# Patient Record
Sex: MALE
[De-identification: names, ages, dates, MRNs, and addresses within clinical notes are randomized per-mention and may not be internally consistent; named-entity substitution may affect disease eponyms.]

---

## 2017-04-05 ENCOUNTER — APPOINTMENT (OUTPATIENT)
Dept: CARDIOLOGY | Facility: CLINIC | Age: 77
End: 2017-04-05

## 2017-04-10 ENCOUNTER — APPOINTMENT (OUTPATIENT)
Dept: CARDIOLOGY | Facility: CLINIC | Age: 77
End: 2017-04-10

## 2017-11-17 ENCOUNTER — RECORD ABSTRACTING (OUTPATIENT)
Age: 77
End: 2017-11-17

## 2017-11-17 DIAGNOSIS — Z87.891 PERSONAL HISTORY OF NICOTINE DEPENDENCE: ICD-10-CM

## 2017-11-17 DIAGNOSIS — Z78.9 OTHER SPECIFIED HEALTH STATUS: ICD-10-CM

## 2017-11-17 RX ORDER — TAMSULOSIN HYDROCHLORIDE 0.4 MG/1
0.4 CAPSULE ORAL
Refills: 0 | Status: ACTIVE | COMMUNITY

## 2017-11-17 RX ORDER — ALLOPURINOL 300 MG/1
300 TABLET ORAL DAILY
Refills: 0 | Status: ACTIVE | COMMUNITY

## 2017-11-22 ENCOUNTER — APPOINTMENT (OUTPATIENT)
Dept: CARDIOLOGY | Facility: CLINIC | Age: 77
End: 2017-11-22
Payer: MEDICARE

## 2017-11-22 VITALS
OXYGEN SATURATION: 99 % | SYSTOLIC BLOOD PRESSURE: 128 MMHG | HEIGHT: 72 IN | WEIGHT: 175 LBS | BODY MASS INDEX: 23.7 KG/M2 | HEART RATE: 79 BPM | DIASTOLIC BLOOD PRESSURE: 72 MMHG

## 2017-11-22 PROCEDURE — 99214 OFFICE O/P EST MOD 30 MIN: CPT

## 2018-01-19 ENCOUNTER — MEDICATION RENEWAL (OUTPATIENT)
Age: 78
End: 2018-01-19

## 2018-04-02 ENCOUNTER — APPOINTMENT (OUTPATIENT)
Dept: CARDIOLOGY | Facility: CLINIC | Age: 78
End: 2018-04-02
Payer: MEDICARE

## 2018-04-02 VITALS
DIASTOLIC BLOOD PRESSURE: 90 MMHG | HEIGHT: 72 IN | HEART RATE: 80 BPM | BODY MASS INDEX: 24.11 KG/M2 | WEIGHT: 178 LBS | SYSTOLIC BLOOD PRESSURE: 148 MMHG

## 2018-04-02 PROCEDURE — 99214 OFFICE O/P EST MOD 30 MIN: CPT

## 2018-04-02 PROCEDURE — 93306 TTE W/DOPPLER COMPLETE: CPT

## 2018-04-27 ENCOUNTER — RX RENEWAL (OUTPATIENT)
Age: 78
End: 2018-04-27

## 2018-06-12 ENCOUNTER — RX RENEWAL (OUTPATIENT)
Age: 78
End: 2018-06-12

## 2018-06-19 ENCOUNTER — CLINICAL ADVICE (OUTPATIENT)
Age: 78
End: 2018-06-19

## 2018-06-28 ENCOUNTER — MOBILE ON CALL (OUTPATIENT)
Age: 78
End: 2018-06-28

## 2018-10-02 ENCOUNTER — NON-APPOINTMENT (OUTPATIENT)
Age: 78
End: 2018-10-02

## 2018-10-02 ENCOUNTER — APPOINTMENT (OUTPATIENT)
Dept: CARDIOLOGY | Facility: CLINIC | Age: 78
End: 2018-10-02
Payer: MEDICARE

## 2018-10-02 VITALS
OXYGEN SATURATION: 98 % | DIASTOLIC BLOOD PRESSURE: 74 MMHG | WEIGHT: 176 LBS | BODY MASS INDEX: 23.84 KG/M2 | HEART RATE: 84 BPM | SYSTOLIC BLOOD PRESSURE: 130 MMHG | HEIGHT: 72 IN

## 2018-10-02 PROCEDURE — 99214 OFFICE O/P EST MOD 30 MIN: CPT

## 2018-10-02 PROCEDURE — 93000 ELECTROCARDIOGRAM COMPLETE: CPT

## 2018-10-23 DIAGNOSIS — R94.5 ABNORMAL RESULTS OF LIVER FUNCTION STUDIES: ICD-10-CM

## 2018-10-26 ENCOUNTER — RX RENEWAL (OUTPATIENT)
Age: 78
End: 2018-10-26

## 2018-10-30 ENCOUNTER — APPOINTMENT (OUTPATIENT)
Dept: CARDIOLOGY | Facility: CLINIC | Age: 78
End: 2018-10-30

## 2018-11-01 ENCOUNTER — RESULT REVIEW (OUTPATIENT)
Age: 78
End: 2018-11-01

## 2019-01-03 ENCOUNTER — RX RENEWAL (OUTPATIENT)
Age: 79
End: 2019-01-03

## 2019-01-04 ENCOUNTER — RX RENEWAL (OUTPATIENT)
Age: 79
End: 2019-01-04

## 2019-02-22 ENCOUNTER — CLINICAL ADVICE (OUTPATIENT)
Age: 79
End: 2019-02-22

## 2019-04-09 ENCOUNTER — APPOINTMENT (OUTPATIENT)
Dept: CARDIOLOGY | Facility: CLINIC | Age: 79
End: 2019-04-09
Payer: MEDICARE

## 2019-04-09 VITALS
HEIGHT: 73 IN | RESPIRATION RATE: 16 BRPM | SYSTOLIC BLOOD PRESSURE: 140 MMHG | BODY MASS INDEX: 23.33 KG/M2 | HEART RATE: 78 BPM | DIASTOLIC BLOOD PRESSURE: 80 MMHG | WEIGHT: 176 LBS

## 2019-04-09 PROCEDURE — 99214 OFFICE O/P EST MOD 30 MIN: CPT

## 2019-04-09 NOTE — REVIEW OF SYSTEMS
[Fever] : no fever [Chills] : no chills [Headache] : no headache [Recent Weight Gain (___ Lbs)] : no recent weight gain [Feeling Fatigued] : feeling fatigued [Recent Weight Loss (___ Lbs)] : no recent weight loss [see HPI] : see HPI [Chest  Pressure] : no chest pressure [Dyspnea on exertion] : dyspnea during exertion [Shortness Of Breath] : no shortness of breath [Chest Pain] : no chest pain [Leg Claudication] : no intermittent leg claudication [Lower Ext Edema] : no extremity edema [Palpitations] : no palpitations [Negative] : Psychiatric

## 2019-04-09 NOTE — HISTORY OF PRESENT ILLNESS
[FreeTextEntry1] : \par Active medical problems, which includes\par Thoracic aortic dilatation. Stable.\par Pancreatic cyst being followed with gastroenterologist.\par Chronic atrial fibrillation. A rate control. On anticoagulation.\par Hyperlipidemia. Next be on cholesterol-lowering agents. Tolerating it well.\par Essential hypertension. No congestive heart failure or renal insufficiency. His nonsmoker.\par Nonischemic cardiomyopathy. Normal coronary arteries in September 2015. Improved lv systolic function. class 2.

## 2019-04-09 NOTE — DISCUSSION/SUMMARY
[Patient] : the patient [Risks] : risks [Benefits] : benefits [Alternatives] : alternatives [___ Month(s)] : [unfilled] month(s) [With Me] : with me [FreeTextEntry1] : Assessment and recommendations.\par #1Fatigue and tiredness. I improved with decrease dose of metoprolol. At present. His blood pressure and heart rate is stable. We will recheck his echocardiogram for any worsening of LV systolic function. If there is worsening of any systolic function. I would consider 24-hour Holter monitor and based on ventricular rate. We will have to discuss about medical management of his beta blockers. We may also consider changing metoprolol to carvedilol.\par He did also have CBC, CMP, and lipid panel checked again.\par #2 non ischemic dilated cardiomyopathy. No signs of congestive heart failure. Improved ejection fraction. Continue beta blockers and ACE inhibitor. Continue to avoid alcohol as much as possible. He drinks one drink a day of wine. High-grade symptoms review to notify us immediately.\par #3 essential hypertension. Well controlled. Continue present medications.\par #4 hyperlipidemia. On cholesterol-lowering agent. Abnormal liver function tests in the past. Negative hepatitis screen. 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 \par #5 moderate mitral regurgitation. No signs of congestive heart failure. Thoracic aortic dilation. Stable.\par #6 chronic atrial fibrillation .  asymptomatic. rate control. On anticoagulation. Follow CBC, BMP closely. Watch for bleeding.\par #7 preventive care with your office.\par \par Counseling regarding low saturated fat, low carbohydrate intake was reviewed. Active lifestyle and regular. Exercise is along with weight maintenance is advised.\par All the above were at length reviewed. Answered all the questions. Thank you very much for this kind referral. Please do not hesitate to give me a call for any question.\par Part of this transcription was done with voice recognition software and phonetically similar errors are common. I apologize for that. Please donot hesitate to call for any questions due to above.\par \par f/u 3 months

## 2019-04-09 NOTE — PHYSICAL EXAM
[General Appearance - Well Developed] : well developed [Normal Appearance] : normal appearance [Well Groomed] : well groomed [General Appearance - Well Nourished] : well nourished [No Deformities] : no deformities [Normal Conjunctiva] : the conjunctiva exhibited no abnormalities [General Appearance - In No Acute Distress] : no acute distress [No Oral Pallor] : no oral pallor [Normal Jugular Venous A Waves Present] : normal jugular venous A waves present [No Jugular Venous Beth A Waves] : no jugular venous beth A waves [Respiration, Rhythm And Depth] : normal respiratory rhythm and effort [Normal Jugular Venous V Waves Present] : normal jugular venous V waves present [Auscultation Breath Sounds / Voice Sounds] : lungs were clear to auscultation bilaterally [Arterial Pulses Normal] : the arterial pulses were normal [Exaggerated Use Of Accessory Muscles For Inspiration] : no accessory muscle use [Abdomen Soft] : soft [FreeTextEntry1] : irregular irregular hsm 1-2/6 , no gallop/rub/heave or click [Edema] : no peripheral edema present [Abnormal Walk] : normal gait [Gait - Sufficient For Exercise Testing] : the gait was sufficient for exercise testing [Nail Clubbing] : no clubbing of the fingernails [Cyanosis, Localized] : no localized cyanosis [Skin Color & Pigmentation] : normal skin color and pigmentation [] : no rash [No Venous Stasis] : no venous stasis [Skin Lesions] : no skin lesions [No Skin Ulcers] : no skin ulcer [No Xanthoma] : no  xanthoma was observed [Oriented To Time, Place, And Person] : oriented to person, place, and time [Mood] : the mood was normal [Affect] : the affect was normal [No Anxiety] : not feeling anxious

## 2019-04-09 NOTE — REASON FOR VISIT
[Follow-Up - Clinic] : a clinic follow-up of [Cardiomyopathy] : cardiomyopathy [Atrial Fibrillation] : atrial fibrillation [Fatigue] : feeling tired (fatigue) [Hyperlipidemia] : hyperlipidemia [Hypertension] : hypertension [Mitral Regurgitation] : mitral regurgitation [FreeTextEntry1] : 78-year-old comes in for followup consultation. Since last seen while in [place]. He had more fatigue and tiredness than before. He had dyspnea on exertion , more than usual while playing golf. He did not have any chest pain. Did not have any palpitations or PND. He did not have any edema. He did not have any dizziness or syncopal episode. Aggravating factors were activity and exercise. At that point we had decreased his metoprolol to 50 mg twice daily. And, subsequently his symptoms have almost resolved.\par No recent hospital admission for congestive heart failure again.\par No bleeding complications. Taking all his medications regularly.

## 2019-04-09 NOTE — ASSESSMENT
[FreeTextEntry1] : Reviewed on October 2, 2018.\par EKG ordered and interpreted by me October 2, 2018 indications atrial fibrillation. Interpretation atrial fibrillation. Right axis deviation. Nonspecific IVCD. No new change compared to before\par \par past tests for reference\par Echocardiogram  which was done on April 5, 2017, PA EF was 58%. Moderate mitral the decision. Ascending and ordered Dilantin 4 cm aortic arch difficult to visualize possible dilation at 4.6 cm. Moderate mitral regurgitation noted\par CTA;4/12/17: ascending aorta: 3.8 cm.\par Echocardiogram. Reviewed April 2, 2018.\par His ejection fraction around 50%. LVH noted\par Labs from January 2018 were reviewed\par \par

## 2019-04-18 ENCOUNTER — APPOINTMENT (OUTPATIENT)
Dept: CARDIOLOGY | Facility: CLINIC | Age: 79
End: 2019-04-18
Payer: MEDICARE

## 2019-04-18 PROCEDURE — 93306 TTE W/DOPPLER COMPLETE: CPT

## 2019-04-24 ENCOUNTER — APPOINTMENT (OUTPATIENT)
Dept: CARDIOLOGY | Facility: CLINIC | Age: 79
End: 2019-04-24

## 2019-04-30 ENCOUNTER — APPOINTMENT (OUTPATIENT)
Dept: CARDIOLOGY | Facility: CLINIC | Age: 79
End: 2019-04-30
Payer: MEDICARE

## 2019-04-30 VITALS
HEIGHT: 72 IN | BODY MASS INDEX: 23.84 KG/M2 | WEIGHT: 176 LBS | HEART RATE: 59 BPM | OXYGEN SATURATION: 96 % | DIASTOLIC BLOOD PRESSURE: 70 MMHG | SYSTOLIC BLOOD PRESSURE: 120 MMHG

## 2019-04-30 PROCEDURE — 99215 OFFICE O/P EST HI 40 MIN: CPT

## 2019-05-02 NOTE — DISCUSSION/SUMMARY
[Patient] : the patient [Risks] : risks [Benefits] : benefits [Alternatives] : alternatives [___ Month(s)] : [unfilled] month(s) [With Me] : with me [FreeTextEntry1] : Assessment and recommendations.\par #1Fatigue and tiredness, resolved after recent changes in medications.Continue present medication\par #2 Echocardiogram reviewed. Nonischemic cardiomyopathy, with overall improved ejection fraction\par Concentric LVH, worsening by atrial enlargement, and ejection fraction around 50%, stable. Aortic dimensions, significant diastolic LV dysfunction in presence of mild to moderate mitral regurgitation and mild pulmonary hypertension. Reviewed. There is no clinical signs of restrictive or constrictive cardiomyopathy. He had normal coronary arteries in the past. He has good functional status with class II heart failure symptoms. At present in presence of a bowel clinical findings on echocardiogram. In spite of him not having any significant symptoms.\par  I have recommended to him.\par Cardiac MRI to rule out any significant structural heart disease, specifically in the form of infiltrative heart disease, evaluate for any significant restriction, evaluate chamber dimensions and severity of mitral regurgitation, and accurately evaluate the ejection fraction.\par I also recommended him to see him and urine protein electrophoresis\par TSH, Lyme titers, ESR, serum ferritin, and iron studies.\par Continue beta blockers and ACE inhibitor. Continue to avoid alcohol as much as possible. He drinks one drink a day of wine. High-grade symptoms review to notify us immediately.\par #3 essential hypertension. Well controlled. Continue present medications.\par #4 hyperlipidemia. On cholesterol-lowering agent. Abnormal liver function tests in the past. Negative hepatitis screen. 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 \par #5 moderate mitral regurgitation. No signs of congestive heart failure. Thoracic aortic dilation. Stable.\par #6 chronic atrial fibrillation .  asymptomatic. rate control. On anticoagulation. Follow CBC, BMP closely. Watch for bleeding.\par #7 preventive care with your office.\par \par Counseling regarding low saturated fat, low carbohydrate intake was reviewed. Active lifestyle and regular. Exercise is along with weight maintenance is advised.\par All the above were at length reviewed. Answered all the questions. Thank you very much for this kind referral. Please do not hesitate to give me a call for any question.\par Part of this transcription was done with voice recognition software and phonetically similar errors are common. I apologize for that. Please donot hesitate to call for any questions due to above.\par \par f/u as planned

## 2019-05-02 NOTE — ASSESSMENT
[FreeTextEntry1] : Reviewed on October 2, 2018.\par EKG ordered and interpreted by me October 2, 2018 indications atrial fibrillation. Interpretation atrial fibrillation. Right axis deviation. Nonspecific IVCD. No new change compared to before\par \par past tests for reference\par Echocardiogram  which was done on April 5, 2017, PA EF was 58%. Moderate mitral the decision. Ascending and ordered Dilantin 4 cm aortic arch difficult to visualize possible dilation at 4.6 cm. Moderate mitral regurgitation noted\par CTA;4/12/17: ascending aorta: 3.8 cm.\par Echocardiogram. Reviewed April 2, 2018.\par His ejection fraction around 50%. LVH noted\par Labs from January 2018 were reviewed\par \par Reviewed on April 30, 2019\par Recent labs from April 9, 2019\par Showed stable CBC, CMP, and lipid panel.\par Echocardiogram April 18, 2019 showed an ejection fraction around 50%. There is mild to moderate concentric LVH. There is biatrial enlargement. The aortic dimension 4.1 cm. Severe diastolic dysfunction. Mild to moderate mitral and tricuspid regurgitation. Pulmonary artery systolic pressure around 45 mm mercury. There was no pericardial effusion noted\par Last EKG you had no significant low voltage.\par \par

## 2019-05-02 NOTE — PHYSICAL EXAM
[General Appearance - Well Developed] : well developed [Normal Appearance] : normal appearance [Well Groomed] : well groomed [General Appearance - Well Nourished] : well nourished [No Deformities] : no deformities [General Appearance - In No Acute Distress] : no acute distress [Normal Conjunctiva] : the conjunctiva exhibited no abnormalities [No Oral Pallor] : no oral pallor [Normal Jugular Venous A Waves Present] : normal jugular venous A waves present [Normal Jugular Venous V Waves Present] : normal jugular venous V waves present [No Jugular Venous Beth A Waves] : no jugular venous beth A waves [Respiration, Rhythm And Depth] : normal respiratory rhythm and effort [Exaggerated Use Of Accessory Muscles For Inspiration] : no accessory muscle use [Auscultation Breath Sounds / Voice Sounds] : lungs were clear to auscultation bilaterally [Arterial Pulses Normal] : the arterial pulses were normal [Edema] : no peripheral edema present [Abdomen Soft] : soft [Abnormal Walk] : normal gait [Gait - Sufficient For Exercise Testing] : the gait was sufficient for exercise testing [Nail Clubbing] : no clubbing of the fingernails [Cyanosis, Localized] : no localized cyanosis [Skin Color & Pigmentation] : normal skin color and pigmentation [] : no rash [No Venous Stasis] : no venous stasis [Skin Lesions] : no skin lesions [No Skin Ulcers] : no skin ulcer [No Xanthoma] : no  xanthoma was observed [Oriented To Time, Place, And Person] : oriented to person, place, and time [Affect] : the affect was normal [Mood] : the mood was normal [No Anxiety] : not feeling anxious [FreeTextEntry1] : irregular irregular hsm 1-2/6 , no gallop/rub/heave or click

## 2019-05-02 NOTE — ADDENDUM
[FreeTextEntry1] : At present, there are no active cardiac conditions.\par No recent unstable coronary syndrome, decompensated heart failure, severe valvular heart disease or significant dysrhythmias.\par The clinical benefit of the proposed procedure outweighs the associated cardiovascular risk.\par Risk not attenuated with further cardiovascular testing.\par Prior testing as outlined above.\par Optimized from a cardiovascular perspective.\par Control blood pressure, heart rate, pulse oximetry perioperatively.\par If required appropriate intravenous medication for the outpatient oral medication for blood pressure, heart rate control.\par At present, there are no active cardiac conditions.\par No recent unstable coronary syndrome, decompensated heart failure, severe valvular heart disease or significant dysrhythmias.\par The clinical benefit of the proposed procedure outweighs the associated cardiovascular risk.\par Risk not attenuated with further cardiovascular testing.\par Prior testing as outlined above.\par Optimized from a cardiovascular perspective.\par Control blood pressure, heart rate, pulse oximetry perioperatively.\par If required appropriate intravenous medication for the outpatient oral medication for blood pressure, heart rate control\par DVT prophylaxis as per indication.\par \par For surgery and invasive procedures, recommend to discontinue apixaban at least 48 hours prior to elective surgery or invasive procedures with a moderate-to-high risk of clinically significant bleeding. Anticoagulation bridging during the 48 hours apixaban is interrupted and prior to the intervention is not generally required. Reinitiate apixaban when adequate hemostasis has been achieved.  If oral therapy cannot be administered, then consider administration of a parenteral anticoagulant. Note excess discontinuation of any oral anticoagulant, including apixaban, increases the risk of thrombotic events. Patient was counseled and verbalizes understanding of these associated risks\par

## 2019-05-02 NOTE — REASON FOR VISIT
[Follow-Up - Clinic] : a clinic follow-up of [Atrial Fibrillation] : atrial fibrillation [Cardiomyopathy] : cardiomyopathy [Fatigue] : feeling tired (fatigue) [Hyperlipidemia] : hyperlipidemia [Hypertension] : hypertension [Mitral Regurgitation] : mitral regurgitation [FreeTextEntry1] : 78-year-old gentleman comes in for follow up consultation to review his echocardiogram and labs.\par  Overall he has been doing very well since her recent changes in medications.\par He has no chest pain, PND, orthopnea.\par He has no dizziness, lightheadedness, or palpitations.\par He has no bleeding diathesis.\par He has no significant headache, visual disturbances, focal weakness.\par He has no recent hospitalization P.

## 2019-05-02 NOTE — REVIEW OF SYSTEMS
[Negative] : Heme/Lymph [Fever] : no fever [Headache] : no headache [Recent Weight Gain (___ Lbs)] : no recent weight gain [Chills] : no chills [Feeling Fatigued] : not feeling fatigued [Recent Weight Loss (___ Lbs)] : no recent weight loss [Shortness Of Breath] : no shortness of breath [Chest  Pressure] : no chest pressure [Dyspnea on exertion] : not dyspnea during exertion [Leg Claudication] : no intermittent leg claudication [Chest Pain] : no chest pain [Lower Ext Edema] : no extremity edema [Palpitations] : no palpitations

## 2019-05-02 NOTE — HISTORY OF PRESENT ILLNESS
[FreeTextEntry1] : Active medical problems, which includes\par Thoracic aortic dilatation. Stable.\par Pancreatic cyst being followed with gastroenterologist.\par Chronic atrial fibrillation. A rate control. On anticoagulation.\par Hyperlipidemia. Next be on cholesterol-lowering agents. Tolerating it well.\par Essential hypertension. No congestive heart failure or renal insufficiency. His nonsmoker.\par Nonischemic cardiomyopathy. Normal coronary arteries in September 2015. Improved lv systolic function. class 2.

## 2019-07-18 ENCOUNTER — APPOINTMENT (OUTPATIENT)
Dept: CARDIOLOGY | Facility: CLINIC | Age: 79
End: 2019-07-18
Payer: MEDICARE

## 2019-07-18 VITALS
HEIGHT: 72 IN | SYSTOLIC BLOOD PRESSURE: 124 MMHG | DIASTOLIC BLOOD PRESSURE: 70 MMHG | OXYGEN SATURATION: 98 % | HEART RATE: 64 BPM

## 2019-07-18 PROCEDURE — 99215 OFFICE O/P EST HI 40 MIN: CPT

## 2019-07-18 NOTE — DISCUSSION/SUMMARY
[Patient] : the patient [Risks] : risks [Benefits] : benefits [Alternatives] : alternatives [___ Month(s)] : [unfilled] month(s) [With Me] : with me [FreeTextEntry1] : Assessment and recommendations.\par #1Nonischemic cardiomyopathy.\par Cardiac MRI, May 24, 2019. Reviewed today. Was not available to today to review. LV ejection fraction 23% via an ejection fraction 23%. 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 Also possibility of other nonischemic etiologies.\par Labs  May 1, 2019 reviewed. ESR 4 iron studies were normal. TSH 1.85.\par Titers were negative. Serum electrophoresis no significant M spike.\par Reviewed on April 30, 2019\par Increase lisinopril to 10 mg twice daily. Continue beta blockers as tolerated.\par Discuss with him regarding followup with cardiomyopathy specialist. Discussed with Dr. Damon\par We will repeat serum electrophoresis, and  free immunoglobin light chain\par f/u  With Dr. Damon\par Further evaluation with him discussed which may include diagnosis with further radioisotopes/ pyrophosphate scans.\par Tissue biopsies.\par Consideration regarding AICD if persistent lower ejection fraction after optimal medical therapy.\par We can consider changing lisinopril  to entresto and spironolactone though of one if there is diagnosis of cardiac amyloid. Optimization of this medication may not have significant help, but may get benefit from chemotherapy.\par # 2 essential hypertension. Well controlled. Continue present medications.\par # 3 hyperlipidemia. On cholesterol-lowering agent. Abnormal liver function tests in the past. Negative hepatitis screen. 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 \par #5 moderate mitral regurgitation. No signs of congestive heart failure. Thoracic aortic dilation. Stable.\par #6 chronic atrial fibrillation .  asymptomatic. rate control. On anticoagulation. Follow CBC, BMP closely. Watch for bleeding.\par #7 preventive care with your office.\par \par Counseling regarding low saturated fat, low carbohydrate intake was reviewed. Active lifestyle and regular. Exercise is along with weight maintenance is advised.\par All the above were at length reviewed. Answered all the questions. Thank you very much for this kind referral. Please do not hesitate to give me a call for any question.\par Part of this transcription was done with voice recognition software and phonetically similar errors are common. I apologize for that. Please donot hesitate to call for any questions due to above.\par \par f/u as planned After evaluation by Dr. Damon

## 2019-07-18 NOTE — REVIEW OF SYSTEMS
[Fever] : no fever [Headache] : no headache [Recent Weight Gain (___ Lbs)] : no recent weight gain [Chills] : no chills [Feeling Fatigued] : not feeling fatigued [Recent Weight Loss (___ Lbs)] : no recent weight loss [Shortness Of Breath] : no shortness of breath [Dyspnea on exertion] : not dyspnea during exertion [Chest  Pressure] : no chest pressure [Chest Pain] : no chest pain [Lower Ext Edema] : no extremity edema [Leg Claudication] : no intermittent leg claudication [Palpitations] : no palpitations [Negative] : Heme/Lymph

## 2019-07-18 NOTE — HISTORY OF PRESENT ILLNESS
[FreeTextEntry1] : Active medical problems, which includes\par Thoracic aortic dilatation. Stable.\par Pancreatic cyst being followed with gastroenterologist.\par Chronic atrial fibrillation. A rate control. On anticoagulation.\par Hyperlipidemia. Next be on cholesterol-lowering agents. Tolerating it well.\par Essential hypertension. No congestive heart failure or renal insufficiency. His nonsmoker.\par Nonischemic cardiomyopathy. Normal coronary arteries in September 2015. Improved lv systolic function. class 2. Recent echocardiogram showing reduction in LV systolic function with possible infiltrative process.

## 2019-07-18 NOTE — PHYSICAL EXAM
[General Appearance - Well Developed] : well developed [Normal Appearance] : normal appearance [Well Groomed] : well groomed [General Appearance - Well Nourished] : well nourished [No Deformities] : no deformities [General Appearance - In No Acute Distress] : no acute distress [Normal Conjunctiva] : the conjunctiva exhibited no abnormalities [No Oral Pallor] : no oral pallor [Normal Jugular Venous A Waves Present] : normal jugular venous A waves present [Normal Jugular Venous V Waves Present] : normal jugular venous V waves present [No Jugular Venous Beth A Waves] : no jugular venous beth A waves [Respiration, Rhythm And Depth] : normal respiratory rhythm and effort [Exaggerated Use Of Accessory Muscles For Inspiration] : no accessory muscle use [Auscultation Breath Sounds / Voice Sounds] : lungs were clear to auscultation bilaterally [Arterial Pulses Normal] : the arterial pulses were normal [Edema] : no peripheral edema present [FreeTextEntry1] : irregular irregular hsm 1-2/6 , no gallop/rub/heave or click [Abdomen Soft] : soft [Abnormal Walk] : normal gait [Gait - Sufficient For Exercise Testing] : the gait was sufficient for exercise testing [Nail Clubbing] : no clubbing of the fingernails [Cyanosis, Localized] : no localized cyanosis [Skin Color & Pigmentation] : normal skin color and pigmentation [] : no rash [No Venous Stasis] : no venous stasis [Skin Lesions] : no skin lesions [No Skin Ulcers] : no skin ulcer [No Xanthoma] : no  xanthoma was observed [Oriented To Time, Place, And Person] : oriented to person, place, and time [Affect] : the affect was normal [Mood] : the mood was normal [No Anxiety] : not feeling anxious

## 2019-07-18 NOTE — ASSESSMENT
[FreeTextEntry1] : Reviewed on October 2, 2018.\par EKG ordered and interpreted by me October 2, 2018 indications atrial fibrillation. Interpretation atrial fibrillation. Right axis deviation. Nonspecific IVCD. No new change compared to before\par \par past tests for reference\par Echocardiogram  which was done on April 5, 2017, PA EF was 58%. Moderate mitral the decision. Ascending and ordered Dilantin 4 cm aortic arch difficult to visualize possible dilation at 4.6 cm. Moderate mitral regurgitation noted\par CTA;4/12/17: ascending aorta: 3.8 cm.\par Echocardiogram. Reviewed April 2, 2018.\par His ejection fraction around 50%. LVH noted\par Labs from January 2018 were reviewed\par \par \par Cardiac MRI, May 24, 2019. Reviewed today. Was not available to today to review. LV ejection fraction 23% via an ejection fraction 23%. 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 Also possibility of other nonischemic etiologies.\par Labs  May 1, 2019 reviewed. ESR 4 iron studies were normal. TSH 1.85.\par Titers were negative. Serum electrophoresis no significant M spike.\par Reviewed on April 30, 2019\par Recent labs from April 9, 2019\par Showed stable CBC, CMP, and lipid panel.\par Echocardiogram April 18, 2019 showed an ejection fraction around 50%. There is mild to moderate concentric LVH. There is biatrial enlargement. The aortic dimension 4.1 cm. Severe diastolic dysfunction. Mild to moderate mitral and tricuspid regurgitation. Pulmonary artery systolic pressure around 45 mm mercury. There was no pericardial effusion noted\par Last EKG you had no significant low voltage.\par \par

## 2019-07-18 NOTE — REASON FOR VISIT
[Follow-Up - Clinic] : a clinic follow-up of [Atrial Fibrillation] : atrial fibrillation [Cardiomyopathy] : cardiomyopathy [Fatigue] : feeling tired (fatigue) [Hyperlipidemia] : hyperlipidemia [Hypertension] : hypertension [Mitral Regurgitation] : mitral regurgitation [FreeTextEntry1] : 79-year-old gentleman comes in for followup consultation today view. His labs and check MRI.\par  Overall he has been doing very well since her recent changes in medications. he has been back on the golf course walking without any symptoms\par He has no chest pain, PND, orthopnea.\par He has no dizziness, lightheadedness, or palpitations.\par He has no bleeding diathesis.\par He has no significant headache, visual disturbances, focal weakness.\par He has no recent hospitalization \par No complaint of neuropathy symptoms

## 2019-07-30 ENCOUNTER — NON-APPOINTMENT (OUTPATIENT)
Age: 79
End: 2019-07-30

## 2019-07-30 ENCOUNTER — APPOINTMENT (OUTPATIENT)
Dept: CARDIOLOGY | Facility: CLINIC | Age: 79
End: 2019-07-30
Payer: MEDICARE

## 2019-07-30 VITALS
BODY MASS INDEX: 24.01 KG/M2 | HEART RATE: 95 BPM | WEIGHT: 177 LBS | OXYGEN SATURATION: 97 % | DIASTOLIC BLOOD PRESSURE: 110 MMHG | SYSTOLIC BLOOD PRESSURE: 164 MMHG

## 2019-07-30 VITALS — SYSTOLIC BLOOD PRESSURE: 150 MMHG | DIASTOLIC BLOOD PRESSURE: 90 MMHG

## 2019-07-30 PROCEDURE — 93000 ELECTROCARDIOGRAM COMPLETE: CPT

## 2019-07-30 PROCEDURE — 36415 COLL VENOUS BLD VENIPUNCTURE: CPT

## 2019-07-30 PROCEDURE — 99214 OFFICE O/P EST MOD 30 MIN: CPT

## 2019-07-31 LAB
25(OH)D3 SERPL-MCNC: 35.2 NG/ML
ALBUMIN SERPL ELPH-MCNC: 4.4 G/DL
ALP BLD-CCNC: 71 U/L
ALT SERPL-CCNC: 42 U/L
ANION GAP SERPL CALC-SCNC: 14 MMOL/L
AST SERPL-CCNC: 32 U/L
BASOPHILS # BLD AUTO: 0.06 K/UL
BASOPHILS NFR BLD AUTO: 0.7 %
BILIRUB SERPL-MCNC: 0.7 MG/DL
BUN SERPL-MCNC: 21 MG/DL
CALCIUM SERPL-MCNC: 9.4 MG/DL
CHLORIDE SERPL-SCNC: 105 MMOL/L
CHOLEST SERPL-MCNC: 123 MG/DL
CHOLEST/HDLC SERPL: 2.9 RATIO
CO2 SERPL-SCNC: 24 MMOL/L
CREAT SERPL-MCNC: 1.12 MG/DL
DEPRECATED KAPPA LC FREE/LAMBDA SER: 0.88 RATIO
EOSINOPHIL # BLD AUTO: 0.1 K/UL
EOSINOPHIL NFR BLD AUTO: 1.2 %
ESTIMATED AVERAGE GLUCOSE: 111 MG/DL
GLUCOSE SERPL-MCNC: 106 MG/DL
HBA1C MFR BLD HPLC: 5.5 %
HCT VFR BLD CALC: 50.5 %
HDLC SERPL-MCNC: 43 MG/DL
HGB BLD-MCNC: 16.6 G/DL
IMM GRANULOCYTES NFR BLD AUTO: 0.4 %
KAPPA LC CSF-MCNC: 1.22 MG/DL
KAPPA LC SERPL-MCNC: 1.07 MG/DL
LDLC SERPL CALC-MCNC: 68 MG/DL
LYMPHOCYTES # BLD AUTO: 1.26 K/UL
LYMPHOCYTES NFR BLD AUTO: 14.8 %
MAN DIFF?: NORMAL
MCHC RBC-ENTMCNC: 32.2 PG
MCHC RBC-ENTMCNC: 32.9 GM/DL
MCV RBC AUTO: 98.1 FL
MONOCYTES # BLD AUTO: 0.66 K/UL
MONOCYTES NFR BLD AUTO: 7.7 %
NEUTROPHILS # BLD AUTO: 6.43 K/UL
NEUTROPHILS NFR BLD AUTO: 75.2 %
PLATELET # BLD AUTO: 190 K/UL
POTASSIUM SERPL-SCNC: 5 MMOL/L
PREALB SERPL NEPH-MCNC: 22 MG/DL
PROT SERPL-MCNC: 6.3 G/DL
RBC # BLD: 5.15 M/UL
RBC # FLD: 13.5 %
SODIUM SERPL-SCNC: 143 MMOL/L
TRIGL SERPL-MCNC: 62 MG/DL
TSH SERPL-ACNC: 1.28 UIU/ML
WBC # FLD AUTO: 8.54 K/UL

## 2019-08-01 NOTE — HISTORY OF PRESENT ILLNESS
[FreeTextEntry1] : Patient is a very active 79 year-old gentleman with known history of chronic atrial fibrillation on direct oral anticoagulation whose recent echocardiogram (April 2019) showed severe diastolic dysfunction, concentric LVH, but whose EKG showed low voltage in the limb leads, leading to a cardiac MRI which showed extensive subendocardial, and RV free wall late gadolinium enhancement suggestive of cardiac amyloidosis (it also showed LVEF 23%).\par \par Patient exercises regularly. He does elliptical for 20-30 minutes and then does machines 3-4 times per week. He also plays golf regularly (2-3 times per week).\par \par Patient with spinal stenosis status post surgery several years ago.\par Patient has bicep tendon rupture (while playing golf).\par No history of carpal tunnel syndrome.\par \par Patient is currently maintained on diltiazem, metoprolol, and lisinopril. The lisinopril was recently uptitrated from 5 mg to 10 mg daily. He believes that a dry cough has started over the period of time since he increased the lisinopril dose.

## 2019-08-01 NOTE — DISCUSSION/SUMMARY
[FreeTextEntry1] : Patient is a 79 year-old with cardiac MRI that is highly suggestive of cardiac amyloidosis.\par Will check serum free light chains to exclude AL-CA (unlikely, given presentation).\par Will also order technetium pyrophosphate scan to confirm diagnosis of TTR amyloidosis (much more likely).\par Once TTR is confirmed, will send genetic testing for mutant vs. wild-type TTR.\par If patient has mutation, will offer genetic counseling for patient and family. Will also then refer for neurologic testing to assess for neuropathy as mutant TTR would qualify patient for approved silencer.\par If patient has wild-type, or does not have neuropathy, patient can be considered for clinical trials for silencers (\par Chronic dry cough over the past month or so, since lisinopril was increased for low LVEF.\par Recommend discontinuing ACEi. Will find alternative antihypertensive if home blood pressure readings are elevated without lisinopril. Likely diuretic.

## 2019-08-01 NOTE — REASON FOR VISIT
[Initial Evaluation] : an initial evaluation of [Spouse] : spouse [FreeTextEntry2] : cardiac amyloidosis

## 2019-08-01 NOTE — PHYSICAL EXAM
[General Appearance - Well Developed] : well developed [General Appearance - Well Nourished] : well nourished [Normal Appearance] : normal appearance [Well Groomed] : well groomed [Conjunctiva] : the conjunctiva were normal in both eyes [No Deformities] : no deformities [General Appearance - In No Acute Distress] : no acute distress [PERRL] : pupils were equal in size, round, and reactive to light [Normal] : the eyelids were normal bilaterally [EOM Intact] : extraocular movements were intact [Normal Oral Mucosa] : normal oral mucosa [No Oral Pallor] : no oral pallor [Normal Jugular Venous A Waves Present] : normal jugular venous A waves present [No Oral Cyanosis] : no oral cyanosis [Normal Oropharynx] : normal oropharynx [Irregularly Irregular] : irregularly irregular [No Jugular Venous Beth A Waves] : no jugular venous beth A waves [Normal Jugular Venous V Waves Present] : normal jugular venous V waves present [No Murmur] : no murmurs heard [2+] : left 2+ [No Pitting Edema] : no pitting edema present [] : no respiratory distress [Exaggerated Use Of Accessory Muscles For Inspiration] : no accessory muscle use [Respiration, Rhythm And Depth] : normal respiratory rhythm and effort [Auscultation Breath Sounds / Voice Sounds] : lungs were clear to auscultation bilaterally [Bowel Sounds] : normal bowel sounds [Abnormal Walk] : normal gait [Abdomen Hernia] : no hernia was discovered [Abdomen Tenderness] : non-tender [Nail Clubbing] : no clubbing of the fingernails [Gait - Sufficient For Exercise Testing] : the gait was sufficient for exercise testing [No Venous Stasis] : no venous stasis [Cyanosis, Localized] : no localized cyanosis [Skin Color & Pigmentation] : normal skin color and pigmentation [No Xanthoma] : no  xanthoma was observed [Oriented To Time, Place, And Person] : oriented to person, place, and time [Impaired Insight] : insight and judgment were intact [Affect] : the affect was normal [No Anxiety] : not feeling anxious [Mood] : the mood was normal [Yellow Sclera (Icteric)] : no scleral icterus was seen [Right Carotid Bruit] : no bruit heard over the right carotid [Left Carotid Bruit] : no bruit heard over the left carotid

## 2019-08-05 ENCOUNTER — MEDICATION RENEWAL (OUTPATIENT)
Age: 79
End: 2019-08-05

## 2019-08-12 RX ORDER — LISINOPRIL 10 MG/1
10 TABLET ORAL TWICE DAILY
Qty: 180 | Refills: 3 | Status: DISCONTINUED | COMMUNITY
End: 2019-08-12

## 2019-08-13 ENCOUNTER — OTHER (OUTPATIENT)
Age: 79
End: 2019-08-13

## 2019-08-19 ENCOUNTER — APPOINTMENT (OUTPATIENT)
Dept: CARDIOLOGY | Facility: CLINIC | Age: 79
End: 2019-08-19
Payer: MEDICARE

## 2019-08-19 VITALS — SYSTOLIC BLOOD PRESSURE: 151 MMHG | DIASTOLIC BLOOD PRESSURE: 100 MMHG

## 2019-08-19 VITALS
BODY MASS INDEX: 23.57 KG/M2 | DIASTOLIC BLOOD PRESSURE: 93 MMHG | HEIGHT: 72 IN | HEART RATE: 67 BPM | WEIGHT: 174 LBS | OXYGEN SATURATION: 100 % | SYSTOLIC BLOOD PRESSURE: 161 MMHG

## 2019-08-19 VITALS
SYSTOLIC BLOOD PRESSURE: 160 MMHG | BODY MASS INDEX: 23.57 KG/M2 | HEIGHT: 72 IN | OXYGEN SATURATION: 98 % | DIASTOLIC BLOOD PRESSURE: 98 MMHG | HEART RATE: 62 BPM | WEIGHT: 174 LBS

## 2019-08-19 DIAGNOSIS — Z98.890 OTHER SPECIFIED POSTPROCEDURAL STATES: ICD-10-CM

## 2019-08-19 DIAGNOSIS — Z86.69 OTHER SPECIFIED POSTPROCEDURAL STATES: ICD-10-CM

## 2019-08-19 DIAGNOSIS — S46.219A STRAIN OF MUSCLE, FASCIA AND TENDON OF OTHER PARTS OF BICEPS, UNSPECIFIED ARM, INITIAL ENCOUNTER: ICD-10-CM

## 2019-08-19 PROCEDURE — 93000 ELECTROCARDIOGRAM COMPLETE: CPT

## 2019-08-19 PROCEDURE — 99214 OFFICE O/P EST MOD 30 MIN: CPT

## 2019-08-19 PROCEDURE — 96040: CPT

## 2019-08-19 NOTE — PHYSICAL EXAM
[Normal Appearance] : normal appearance [General Appearance - Well Developed] : well developed [Well Groomed] : well groomed [General Appearance - Well Nourished] : well nourished [No Deformities] : no deformities [General Appearance - In No Acute Distress] : no acute distress [Normal Conjunctiva] : the conjunctiva exhibited no abnormalities [No Oral Pallor] : no oral pallor [Normal Jugular Venous V Waves Present] : normal jugular venous V waves present [Normal Jugular Venous A Waves Present] : normal jugular venous A waves present [No Jugular Venous Beth A Waves] : no jugular venous beth A waves [Respiration, Rhythm And Depth] : normal respiratory rhythm and effort [Exaggerated Use Of Accessory Muscles For Inspiration] : no accessory muscle use [Auscultation Breath Sounds / Voice Sounds] : lungs were clear to auscultation bilaterally [Arterial Pulses Normal] : the arterial pulses were normal [Edema] : no peripheral edema present [Abdomen Soft] : soft [FreeTextEntry1] : irregular irregular hsm 1-2/6 , no gallop/rub/heave or click [Gait - Sufficient For Exercise Testing] : the gait was sufficient for exercise testing [Abnormal Walk] : normal gait [Cyanosis, Localized] : no localized cyanosis [Nail Clubbing] : no clubbing of the fingernails [Skin Color & Pigmentation] : normal skin color and pigmentation [] : no rash [No Venous Stasis] : no venous stasis [No Skin Ulcers] : no skin ulcer [Skin Lesions] : no skin lesions [No Xanthoma] : no  xanthoma was observed [Oriented To Time, Place, And Person] : oriented to person, place, and time [Affect] : the affect was normal [Mood] : the mood was normal [No Anxiety] : not feeling anxious

## 2019-08-19 NOTE — REVIEW OF SYSTEMS
[Fever] : no fever [Headache] : no headache [Recent Weight Gain (___ Lbs)] : no recent weight gain [Chills] : no chills [Feeling Fatigued] : not feeling fatigued [Recent Weight Loss (___ Lbs)] : no recent weight loss [Shortness Of Breath] : no shortness of breath [Chest  Pressure] : no chest pressure [Dyspnea on exertion] : not dyspnea during exertion [Chest Pain] : no chest pain [Lower Ext Edema] : no extremity edema [Leg Claudication] : no intermittent leg claudication [Palpitations] : no palpitations [Negative] : Heme/Lymph

## 2019-08-19 NOTE — HISTORY OF PRESENT ILLNESS
[FreeTextEntry1] : Active medical problems, which includes\par Thoracic aortic dilatation. Stable.\par Pancreatic cyst being followed with gastroenterologist.\par Chronic atrial fibrillation. A rate control. On anticoagulation.\par Hyperlipidemia. Next be on cholesterol-lowering agents. Tolerating it well.\par Essential hypertension. No congestive heart failure or renal insufficiency. His nonsmoker.\par Nonischemic cardiomyopathy. Normal coronary arteries in September 2015. Improved lv systolic function. class 2. Recent echocardiogram showing reduction in LV systolic function with possible infiltrative process. Now diagnosis of TTR amyloid cardiomyopathy.

## 2019-08-19 NOTE — ASSESSMENT
[FreeTextEntry1] : Reviewed on October 2, 2018.\par EKG ordered and interpreted by me October 2, 2018 indications atrial fibrillation. Interpretation atrial fibrillation. Right axis deviation. Nonspecific IVCD. No new change compared to before\par \par past tests for reference\par Echocardiogram  which was done on April 5, 2017, PA EF was 58%. Moderate mitral the decision. Ascending and ordered Dilantin 4 cm aortic arch difficult to visualize possible dilation at 4.6 cm. Moderate mitral regurgitation noted\par CTA;4/12/17: ascending aorta: 3.8 cm.\par Echocardiogram. Reviewed April 2, 2018.\par His ejection fraction around 50%. LVH noted\par Labs from January 2018 were reviewed\par \par \par Cardiac MRI, May 24, 2019. Reviewed today. Was not available to today to review. LV ejection fraction 23% via an ejection fraction 23%. 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 Also possibility of other nonischemic etiologies.\par Labs  May 1, 2019 reviewed. ESR 4 iron studies were normal. TSH 1.85.\par Titers were negative. Serum electrophoresis no significant M spike.\par Reviewed on April 30, 2019\par Recent labs from April 9, 2019\par Showed stable CBC, CMP, and lipid panel.\par Echocardiogram April 18, 2019 showed an ejection fraction around 50%. There is mild to moderate concentric LVH. There is biatrial enlargement. The aortic dimension 4.1 cm. Severe diastolic dysfunction. Mild to moderate mitral and tricuspid regurgitation. Pulmonary artery systolic pressure around 45 mm mercury. There was no pericardial effusion noted\par \par \par

## 2019-08-19 NOTE — REASON FOR VISIT
[Follow-Up - Clinic] : a clinic follow-up of [Atrial Fibrillation] : atrial fibrillation [Cardiomyopathy] : cardiomyopathy [Hyperlipidemia] : hyperlipidemia [Hypertension] : hypertension [Mitral Regurgitation] : mitral regurgitation [FreeTextEntry1] : 79-year-old gentleman comes in for followup consultation today after recent evaluation for his abnormal cardiac MRI at amyloid clinic by Dr. Nunes, now planned to have genetic testing. His ACE inhibitor was discontinued. Spironolactone was started.\par He offers no change in his symptoms. He is feeling very well and has been playing golf without any significant symptoms.\par His fatigue and tiredness has improved with changes in the dosage of his AV paola blocking agents.\par No significant side effects of spironolactone.\par His cough is slightly better of ACE inhibitor.\par He has no chest pain, PND, orthopnea.\par He has no dizziness, lightheadedness, or palpitations.\par He has no bleeding diathesis.\par He has no significant headache, visual disturbances, focal weakness.\par He has no recent hospitalization \par No complaint of neuropathy symptoms

## 2019-08-19 NOTE — DISCUSSION/SUMMARY
[Patient] : the patient [Risks] : risks [Benefits] : benefits [Alternatives] : alternatives [___ Month(s)] : [unfilled] month(s) [FreeTextEntry1] : Assessment and recommendations.\par #1 Nonischemic cardiomyopathy.\par TTR amyloid cardiomyopathy. Now being followed by Dr. Nunes.\par Medications were changed recently.\par Genetic testing today.\par We will repeat the echocardiogram for LV ejection fraction considering there is discrepancy in LV ejection fraction by cardiac MRI and echocardiogram. Cardiac MRI may not accurate. Ejection fraction because of atrial fibrillation with irregular RR interval.\par Reviewed the pathophysiology and management plan as much as possible from general cardiology point of view. He understands he will have to be followed that specialized cardiac clinic. So that appropriate medications and followups can be done.\par # 2 essential hypertension. Borderline at present, appears slightly anxious. We will follow and adjust the medications.\par # 3 hyperlipidemia. On cholesterol-lowering agent. Abnormal liver function tests in the past. Negative hepatitis screen. 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 \par #5 moderate mitral regurgitation. No signs of congestive heart failure. Thoracic aortic dilation. Stable.\par #6 chronic atrial fibrillation . Considering the ventricular rate is moderately controlled and relatively asymptomatic. We will continue with present dosage of Cardizem and metoprolol. Continue anticoagulation. Watch for bleeding.\par #7 He is having a skin biopsy. He is stable from cardiac point of view. He will hold his anticoagulation 48 hours before. He will be started after 12-24 hours off to surgery.\par He will contact me for any change in his symptoms\par \par Counseling regarding low saturated fat, low carbohydrate intake was reviewed. Active lifestyle and regular. Exercise is along with weight maintenance is advised.\par All the above were at length reviewed. Answered all the questions. Thank you very much for this kind referral. Please do not hesitate to give me a call for any question.\par Part of this transcription was done with voice recognition software and phonetically similar errors are common. I apologize for that. Please donot hesitate to call for any questions due to above.\par \par He will have repeat BMP checked on spironolactone. I be happy to see him again in one month.\par  [With Me] : with me

## 2019-08-22 PROBLEM — Z98.890 H/O DETACHED RETINA REPAIR: Status: RESOLVED | Noted: 2019-08-22 | Resolved: 2019-08-22

## 2019-08-22 PROBLEM — S46.219A BICEPS TENDON RUPTURE: Status: RESOLVED | Noted: 2019-08-22 | Resolved: 2019-08-22

## 2019-08-22 NOTE — HISTORY OF PRESENT ILLNESS
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  His EF of 4/2019 demonstrated severe diastolic dysfxn and LVH and his EKG demonstrated low voltage.  He underwent a cMRI which demonstrated extensive subendocardial and RV free wall LGE suggestive of amyloid.  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  His Medical history is also significant for spinal stenosis surgery, a biceps tendon rupture (occurred while playing golf) and a hx of a detached retina.  Re the retina this occurred 19 yrs ago suddenly when he developed cloudy vision while on the golf course.  he denied trauma to the eye.  This was successfully repaired with a bubble surgery and later laser surgery. \par today he presents for a further cardiogenomic evaluation.

## 2019-08-22 NOTE — REVIEW OF SYSTEMS
[Nl] : ENT [Wgt Loss (___ lbs)] : no recent weight loss [Wgt Gain (___ Lbs)] : no recent weight gain [Muscle Weakness] : no muscle weakness [Subluxation] : no subluxation was seen [Joint Dislocation] : no dislocation [Cyanosis] : no cyanosis [Edema] : no ~T edema [Diaphoresis] : no diaphoresis [Chest Pain] : no chest pain [Exercise Intolerance] : no exercise intolerance [Palpitations] : no palpitations [Change in Vision] : no change in vision [Tachypnea] : no tachypnea [Change in Appetite] : no change in appetite [Shortness of Breath] : shortness of breath [Vomiting] : no vomiting  [Diarrhea] : no diarrhea [Food Intolerance] : no food intolerance [Constipation] : no constipation [FreeTextEntry2] : 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

## 2019-08-22 NOTE — FAMILY HISTORY
[FreeTextEntry1] : FamilyHistory_20_twCiteListControlStart FamilyHistory_20_twCiteListControlEnd [id number] \par A four-generation family history was constructed and scanned into [employer]. \par Family history is significant for a mother with a hx of macular degneration, a \par Both his maternal and paternal families originate from indicate country or region. \par No Ashkenazi Gnosticism ancestry. Family history was negative for consenguinity  No family history of SIDS\par  \par \par

## 2019-08-22 NOTE — PHYSICAL EXAM
[Alert] : alert [In no acute distress] :  in no acute distress [Active] : active [Well nourished] : well nourished [Well developed] : well developed [Interested in people and surroundings] : interested in people and surroundings [Normal] : orientated to person, place, and time [de-identified] : cta bl [de-identified] : irreg [de-identified] : luis caba 5/5. LE 5/5

## 2019-08-22 NOTE — REASON FOR VISIT
[Initial - Scheduled] : [unfilled]  is being seen for  ~M an initial scheduled visit [Spouse] : spouse [FreeTextEntry3] : MIMA OWENS  is being seen  for an initial consultation at the [facility] at [facility] on 08/19/2019.   Mr. OWENS was referred by Dr Neal and Dr Nunes for hereditary cardiac predisposition risk assessment and counseling, due to his TTR cardiac amyloidosis \par \par

## 2019-08-27 ENCOUNTER — APPOINTMENT (OUTPATIENT)
Dept: CARDIOLOGY | Facility: CLINIC | Age: 79
End: 2019-08-27
Payer: MEDICARE

## 2019-08-27 ENCOUNTER — MEDICATION RENEWAL (OUTPATIENT)
Age: 79
End: 2019-08-27

## 2019-08-27 PROCEDURE — 0399T: CPT

## 2019-08-27 PROCEDURE — 93308 TTE F-UP OR LMTD: CPT

## 2019-09-09 ENCOUNTER — NON-APPOINTMENT (OUTPATIENT)
Age: 79
End: 2019-09-09

## 2019-09-09 ENCOUNTER — APPOINTMENT (OUTPATIENT)
Dept: CARDIOLOGY | Facility: CLINIC | Age: 79
End: 2019-09-09
Payer: MEDICARE

## 2019-09-09 ENCOUNTER — APPOINTMENT (OUTPATIENT)
Dept: CARDIOLOGY | Facility: CLINIC | Age: 79
End: 2019-09-09

## 2019-09-09 VITALS — OXYGEN SATURATION: 100 % | HEART RATE: 84 BPM | SYSTOLIC BLOOD PRESSURE: 128 MMHG | DIASTOLIC BLOOD PRESSURE: 79 MMHG

## 2019-09-09 VITALS — WEIGHT: 173 LBS | BODY MASS INDEX: 23.43 KG/M2 | HEIGHT: 72 IN

## 2019-09-09 DIAGNOSIS — R20.0 ANESTHESIA OF SKIN: ICD-10-CM

## 2019-09-09 DIAGNOSIS — R20.2 ANESTHESIA OF SKIN: ICD-10-CM

## 2019-09-09 PROCEDURE — 96040: CPT

## 2019-09-09 PROCEDURE — 99215 OFFICE O/P EST HI 40 MIN: CPT

## 2019-09-09 PROCEDURE — 93000 ELECTROCARDIOGRAM COMPLETE: CPT

## 2019-09-09 NOTE — REASON FOR VISIT
[Spouse] : spouse [Follow-Up - Clinic] : a clinic follow-up of [FreeTextEntry2] : cardiac amyloidosis

## 2019-09-09 NOTE — DISCUSSION/SUMMARY
[FreeTextEntry1] : Patient is a 79 year-old with cardiac MRI that is highly suggestive of cardiac amyloidosis.\par Serum free light chains were normal (July 2019).\par PYP scan confirmed TTR amyloidosis. 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 \par \par Patient feels better since stopping ACEi. 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 Hutchinson. If Vyndaqel is cost prohibitive, will consider referral for AG10 trial or eventual enrollment in [facility] [employer]

## 2019-09-09 NOTE — HISTORY OF PRESENT ILLNESS
[FreeTextEntry1] : Patient is a very active 79 year-old gentleman with known history of chronic atrial fibrillation on direct oral anticoagulation whose recent echocardiogram (April 2019) showed severe diastolic dysfunction, concentric LVH, but whose EKG showed low voltage in the limb leads, leading to a cardiac MRI which showed extensive subendocardial, and RV free wall late gadolinium enhancement suggestive of cardiac amyloidosis (it also showed LVEF 23%).\par \par Patient exercises regularly. He does elliptical for 20-30 minutes and then does machines 3-4 times per week. He also plays golf regularly (2-3 times per week).\par \par Patient with spinal stenosis status post surgery several years ago.\par Patient has bicep tendon rupture (while playing golf).\par No history of carpal tunnel syndrome.\par \par Patient is currently maintained on diltiazem, metoprolol, and lisinopril. The lisinopril was recently uptitrated from 5 mg to 10 mg daily. He believes that a dry cough has started over the period of time since he increased the lisinopril dose.\par \par September 2019 - Patient stopped lisinopril after his first visit here. His cough has resolved and he is feeling better. Repeat echocardiogram with Dr. Neal showed LVEF 45-50% (LVEF on MRI was erroneously low). Patient's genetic testing (performed through [facility]) was negative for mutation.

## 2019-09-09 NOTE — PHYSICAL EXAM
[General Appearance - Well Developed] : well developed [Well Groomed] : well groomed [Normal Appearance] : normal appearance [General Appearance - Well Nourished] : well nourished [No Deformities] : no deformities [General Appearance - In No Acute Distress] : no acute distress [Normal Oral Mucosa] : normal oral mucosa [No Oral Pallor] : no oral pallor [No Oral Cyanosis] : no oral cyanosis [Normal Oropharynx] : normal oropharynx [Normal Jugular Venous A Waves Present] : normal jugular venous A waves present [No Jugular Venous Beth A Waves] : no jugular venous beth A waves [Normal Jugular Venous V Waves Present] : normal jugular venous V waves present [] : no respiratory distress [Respiration, Rhythm And Depth] : normal respiratory rhythm and effort [Exaggerated Use Of Accessory Muscles For Inspiration] : no accessory muscle use [Auscultation Breath Sounds / Voice Sounds] : lungs were clear to auscultation bilaterally [Bowel Sounds] : normal bowel sounds [Abdomen Tenderness] : non-tender [Abdomen Hernia] : no hernia was discovered [Abnormal Walk] : normal gait [Gait - Sufficient For Exercise Testing] : the gait was sufficient for exercise testing [Nail Clubbing] : no clubbing of the fingernails [Cyanosis, Localized] : no localized cyanosis [Skin Color & Pigmentation] : normal skin color and pigmentation [No Xanthoma] : no  xanthoma was observed [No Venous Stasis] : no venous stasis [Impaired Insight] : insight and judgment were intact [Oriented To Time, Place, And Person] : oriented to person, place, and time [Mood] : the mood was normal [Affect] : the affect was normal [No Anxiety] : not feeling anxious [PERRL] : pupils were equal in size, round, and reactive to light [Conjunctiva] : the conjunctiva were normal in both eyes [Normal] : the eyelids were normal bilaterally [EOM Intact] : extraocular movements were intact [Irregularly Irregular] : irregularly irregular [No Murmur] : no murmurs heard [2+] : left 2+ [No Pitting Edema] : no pitting edema present [Yellow Sclera (Icteric)] : no scleral icterus was seen [Right Carotid Bruit] : no bruit heard over the right carotid [Left Carotid Bruit] : no bruit heard over the left carotid

## 2019-09-12 NOTE — FAMILY HISTORY
[FreeTextEntry1] : A four-generation family history was constructed and scanned into [employer]. \par Family history is significant for a mother with a hx of macular degneration, a \par Both his maternal and paternal families originate from indicate country or region. \par No Ashkenazi Christianity ancestry. Family history was negative for consenguinity No family history of SIDS\par  \par

## 2019-09-12 NOTE — PHYSICAL EXAM
[Active] : active [Alert] : alert [In no acute distress] :  in no acute distress [Interested in people and surroundings] : interested in people and surroundings [Well developed] : well developed [Well nourished] : well nourished [Normal] : orientated to person, place, and time [de-identified] : cta bl [de-identified] : irreg [de-identified] : luis caba 5/5. LE 5/5

## 2019-09-12 NOTE — REVIEW OF SYSTEMS
[Nl] : Neurological [Wgt Loss (___ lbs)] : no recent weight loss [Wgt Gain (___ Lbs)] : no recent weight gain [Muscle Weakness] : no muscle weakness [Subluxation] : no subluxation was seen [Joint Dislocation] : no dislocation [Cyanosis] : no cyanosis [Edema] : no ~T edema [Diaphoresis] : no diaphoresis [Chest Pain] : no chest pain [Exercise Intolerance] : no exercise intolerance [Palpitations] : no palpitations [Change in Vision] : no change in vision [Tachypnea] : no tachypnea [Shortness of Breath] : shortness of breath [Vomiting] : no vomiting  [Change in Appetite] : no change in appetite [Food Intolerance] : no food intolerance [Diarrhea] : no diarrhea [Constipation] : no constipation [FreeTextEntry2] : 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

## 2019-09-12 NOTE — HISTORY OF PRESENT ILLNESS
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  His EF of 4/2019 demonstrated severe diastolic dysfxn and LVH and his EKG demonstrated low voltage.  He underwent a cMRI which demonstrated extensive subendocardial and RV free wall LGE suggestive of amyloid.  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  His Medical history is also significant for spinal stenosis surgery, a biceps tendon rupture (occurred while playing golf) and a hx of a detached retina.  Re the retina this occurred 19 yrs ago suddenly when he developed cloudy vision while on the golf [place].  he denied trauma to the eye.  This was successfully repaired with a bubble surgery and later laser surgery. \par today he presents for results of his genetic testing \par

## 2019-09-12 NOTE — REASON FOR VISIT
[FreeTextEntry3] : MIMA OWENS was initially seen consultation at the [facility] at [facility] on 08/19/2019. Mr. OWENS was referred by Dr Neal and Dr Nunes for hereditary cardiac predisposition risk assessment and counseling, due to his TTR cardiac amyloidosis\par today he presents for results [Spouse] : spouse

## 2019-09-18 ENCOUNTER — APPOINTMENT (OUTPATIENT)
Dept: CARDIOLOGY | Facility: CLINIC | Age: 79
End: 2019-09-18
Payer: MEDICARE

## 2019-09-18 VITALS
HEIGHT: 72 IN | SYSTOLIC BLOOD PRESSURE: 132 MMHG | BODY MASS INDEX: 23.57 KG/M2 | WEIGHT: 174 LBS | HEART RATE: 90 BPM | DIASTOLIC BLOOD PRESSURE: 70 MMHG | OXYGEN SATURATION: 97 %

## 2019-09-18 PROCEDURE — 99214 OFFICE O/P EST MOD 30 MIN: CPT

## 2019-09-18 RX ORDER — DILTIAZEM HYDROCHLORIDE 120 MG/1
120 CAPSULE, EXTENDED RELEASE ORAL
Qty: 90 | Refills: 3 | Status: DISCONTINUED | COMMUNITY
Start: 2019-02-22 | End: 2019-09-18

## 2019-09-18 RX ORDER — TAFAMIDIS MEGLUMINE 20 MG/1
20 CAPSULE, LIQUID FILLED ORAL DAILY
Qty: 360 | Refills: 2 | Status: DISCONTINUED | COMMUNITY
Start: 2019-08-27 | End: 2019-09-18

## 2019-09-18 NOTE — PHYSICAL EXAM
[General Appearance - Well Developed] : well developed [Normal Appearance] : normal appearance [Well Groomed] : well groomed [General Appearance - Well Nourished] : well nourished [General Appearance - In No Acute Distress] : no acute distress [No Deformities] : no deformities [Normal Conjunctiva] : the conjunctiva exhibited no abnormalities [No Oral Pallor] : no oral pallor [Normal Jugular Venous A Waves Present] : normal jugular venous A waves present [No Jugular Venous Beth A Waves] : no jugular venous beth A waves [Normal Jugular Venous V Waves Present] : normal jugular venous V waves present [Respiration, Rhythm And Depth] : normal respiratory rhythm and effort [Exaggerated Use Of Accessory Muscles For Inspiration] : no accessory muscle use [Auscultation Breath Sounds / Voice Sounds] : lungs were clear to auscultation bilaterally [Arterial Pulses Normal] : the arterial pulses were normal [Edema] : no peripheral edema present [FreeTextEntry1] : irregular irregular hsm 1-2/6 , no gallop/rub/heave or click [Abdomen Soft] : soft [Abnormal Walk] : normal gait [Nail Clubbing] : no clubbing of the fingernails [Gait - Sufficient For Exercise Testing] : the gait was sufficient for exercise testing [Cyanosis, Localized] : no localized cyanosis [Skin Color & Pigmentation] : normal skin color and pigmentation [] : no rash [No Venous Stasis] : no venous stasis [Skin Lesions] : no skin lesions [No Skin Ulcers] : no skin ulcer [No Xanthoma] : no  xanthoma was observed [Oriented To Time, Place, And Person] : oriented to person, place, and time [Affect] : the affect was normal [Mood] : the mood was normal [No Anxiety] : not feeling anxious

## 2019-09-18 NOTE — REVIEW OF SYSTEMS
[Fever] : no fever [Headache] : no headache [Recent Weight Gain (___ Lbs)] : no recent weight gain [Chills] : no chills [Feeling Fatigued] : not feeling fatigued [Recent Weight Loss (___ Lbs)] : no recent weight loss [Shortness Of Breath] : no shortness of breath [Dyspnea on exertion] : not dyspnea during exertion [Chest  Pressure] : no chest pressure [Chest Pain] : no chest pain [Lower Ext Edema] : no extremity edema [Leg Claudication] : no intermittent leg claudication [Palpitations] : no palpitations [Negative] : Psychiatric

## 2019-09-18 NOTE — ASSESSMENT
[FreeTextEntry1] : Reviewed on October 2, 2018.\par EKG ordered and interpreted by me October 2, 2018 indications atrial fibrillation. Interpretation atrial fibrillation. Right axis deviation. Nonspecific IVCD. No new change compared to before\par \par past tests for reference\par Echocardiogram  which was done on April 5, 2017, PA EF was 58%. Moderate mitral the decision. Ascending and ordered Dilantin 4 cm aortic arch difficult to visualize possible dilation at 4.6 cm. Moderate mitral regurgitation noted\par CTA;4/12/17: ascending aorta: 3.8 cm.\par Echocardiogram. Reviewed April 2, 2018.\par His ejection fraction around 50%. LVH noted\par Labs from January 2018 were reviewed\par \par \par Cardiac MRI, May 24, 2019. Reviewed today. Was not available to today to review. LV ejection fraction 23% via an ejection fraction 23%. 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 Also possibility of other nonischemic etiologies.\par Labs  May 1, 2019 reviewed. ESR 4 iron studies were normal. TSH 1.85.\par Titers were negative. Serum electrophoresis no significant M spike.\par Reviewed on April 30, 2019\par Recent labs from April 9, 2019\par Showed stable CBC, CMP, and lipid panel.\par Echocardiogram April 18, 2019 showed an ejection fraction around 50%. There is mild to moderate concentric LVH. There is biatrial enlargement. The aortic dimension 4.1 cm. Severe diastolic dysfunction. Mild to moderate mitral and tricuspid regurgitation. Pulmonary artery systolic pressure around 45 mm mercury. There was no pericardial effusion noted\par \par Reviewed on September 18, 2019\par Labs from September 12, 2019 creatinine 0.95, potassium 4.7, sodium 142, glucose 96.\par Echo cardiac August 27 was 19 and ejection fraction 45-50%. Concentric LVH. Normal longitudinal strain at the apex. Global longitudinal strain reduced suggestive of diastolic dysfunction. Moderate concentric LVH. Significant right atrial enlargement.\par

## 2019-09-18 NOTE — REASON FOR VISIT
[Follow-Up - Clinic] : a clinic follow-up of [Atrial Fibrillation] : atrial fibrillation [Cardiomyopathy] : cardiomyopathy [Hyperlipidemia] : hyperlipidemia [Mitral Regurgitation] : mitral regurgitation [Hypertension] : hypertension [FreeTextEntry1] : 79-year-old gentleman comes in for followup consultation to review his echocardiogram and labs.\par He is now stable on spironolactone.\par He has no increasing palpitations after coming off carvedilol.\par He offers no change in his symptoms. He is feeling very well and has been playing golf without any significant symptoms.\par He has no chest pain, PND, orthopnea.\par He has no dizziness, lightheadedness, or palpitations.\par He has no bleeding diathesis.\par He has no significant headache, visual disturbances, focal weakness.\par He has no recent hospitalization \par No complaint of neuropathy symptoms\par He has been approved for his amyloid cardiomyopathy (TTR) medication

## 2019-09-18 NOTE — DISCUSSION/SUMMARY
[Patient] : the patient [Benefits] : benefits [Risks] : risks [Alternatives] : alternatives [___ Month(s)] : [unfilled] month(s) [With Me] : with me [FreeTextEntry1] : Assessment and recommendations.\par #1 Nonischemic cardiomyopathy.\par TTR amyloid cardiomyopathy. Now being followed by Dr. Nunes.\par His approved for his amyloid cardiomyopathy, medications. To be followed and started by Dr. Nunes.\par His echocardiogram shows stable mildly reduced LV systolic function. Diastolic dysfunction related to hypertrophy/infiltrative cardiomyopathy. He will continue with his spironolactone and metoprolol as advised. No followup labs.\par # 2 essential hypertension.Much better controlled. Continue present regimen of medications.\par # 3 hyperlipidemia. On cholesterol-lowering agent. Abnormal liver function tests in the past. Negative hepatitis screen. 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 \par #5 moderate mitral regurgitation. No signs of congestive heart failure. Thoracic aortic dilation. Stable.\par #6 chronic atrial fibrillation . Considering the ventricular rate is moderately controlled and relatively asymptomatic. He'll continue with metoprolol. He'll continue with anticoagulation. Watch for bleeding.\par \par \par Counseling regarding low saturated fat, low carbohydrate intake was reviewed. Active lifestyle and regular. Exercise is along with weight maintenance is advised.\par All the above were at length reviewed. Answered all the questions. Thank you very much for this kind referral. Please do not hesitate to give me a call for any question.\par Part of this transcription was done with voice recognition software and phonetically similar errors are common. I apologize for that. Please donot hesitate to call for any questions due to above.\par \par He will have repeat BMP checked on spironolactone. I be happy to see him again in one month.\par

## 2019-09-20 ENCOUNTER — MEDICATION RENEWAL (OUTPATIENT)
Age: 79
End: 2019-09-20

## 2019-09-23 ENCOUNTER — MEDICATION RENEWAL (OUTPATIENT)
Age: 79
End: 2019-09-23

## 2019-10-18 ENCOUNTER — RX RENEWAL (OUTPATIENT)
Age: 79
End: 2019-10-18

## 2019-11-11 ENCOUNTER — NON-APPOINTMENT (OUTPATIENT)
Age: 79
End: 2019-11-11

## 2019-11-11 ENCOUNTER — APPOINTMENT (OUTPATIENT)
Dept: NEUROLOGY | Facility: CLINIC | Age: 79
End: 2019-11-11
Payer: MEDICARE

## 2019-11-11 ENCOUNTER — APPOINTMENT (OUTPATIENT)
Dept: CARDIOLOGY | Facility: CLINIC | Age: 79
End: 2019-11-11
Payer: MEDICARE

## 2019-11-11 VITALS
HEIGHT: 72 IN | DIASTOLIC BLOOD PRESSURE: 90 MMHG | HEART RATE: 82 BPM | SYSTOLIC BLOOD PRESSURE: 151 MMHG | BODY MASS INDEX: 23.7 KG/M2 | OXYGEN SATURATION: 97 % | WEIGHT: 175 LBS

## 2019-11-11 DIAGNOSIS — R93.1 ABNORMAL FINDINGS ON DIAGNOSTIC IMAGING OF HEART AND CORONARY CIRCULATION: ICD-10-CM

## 2019-11-11 PROCEDURE — 95909 NRV CNDJ TST 5-6 STUDIES: CPT

## 2019-11-11 PROCEDURE — 36415 COLL VENOUS BLD VENIPUNCTURE: CPT

## 2019-11-11 PROCEDURE — 95885 MUSC TST DONE W/NERV TST LIM: CPT | Mod: 59

## 2019-11-11 PROCEDURE — 93000 ELECTROCARDIOGRAM COMPLETE: CPT

## 2019-11-11 PROCEDURE — 99215 OFFICE O/P EST HI 40 MIN: CPT

## 2019-11-12 LAB
25(OH)D3 SERPL-MCNC: 28.7 NG/ML
ALBUMIN SERPL ELPH-MCNC: 4.5 G/DL
ALP BLD-CCNC: 53 U/L
ALT SERPL-CCNC: 35 U/L
ANION GAP SERPL CALC-SCNC: 16 MMOL/L
AST SERPL-CCNC: 29 U/L
BASOPHILS # BLD AUTO: 0.06 K/UL
BASOPHILS NFR BLD AUTO: 0.8 %
BILIRUB SERPL-MCNC: 1.1 MG/DL
BUN SERPL-MCNC: 21 MG/DL
CALCIUM SERPL-MCNC: 9.5 MG/DL
CHLORIDE SERPL-SCNC: 103 MMOL/L
CHOLEST SERPL-MCNC: 134 MG/DL
CHOLEST/HDLC SERPL: 3 RATIO
CO2 SERPL-SCNC: 24 MMOL/L
CREAT SERPL-MCNC: 1.3 MG/DL
EOSINOPHIL # BLD AUTO: 0.07 K/UL
EOSINOPHIL NFR BLD AUTO: 0.9 %
ESTIMATED AVERAGE GLUCOSE: 111 MG/DL
GLUCOSE SERPL-MCNC: 100 MG/DL
HBA1C MFR BLD HPLC: 5.5 %
HCT VFR BLD CALC: 52.2 %
HDLC SERPL-MCNC: 45 MG/DL
HGB BLD-MCNC: 16.9 G/DL
IMM GRANULOCYTES NFR BLD AUTO: 0.4 %
LDLC SERPL CALC-MCNC: 64 MG/DL
LYMPHOCYTES # BLD AUTO: 1.08 K/UL
LYMPHOCYTES NFR BLD AUTO: 14.3 %
MAN DIFF?: NORMAL
MCHC RBC-ENTMCNC: 32.4 GM/DL
MCHC RBC-ENTMCNC: 33.1 PG
MCV RBC AUTO: 102.2 FL
MONOCYTES # BLD AUTO: 0.54 K/UL
MONOCYTES NFR BLD AUTO: 7.2 %
NEUTROPHILS # BLD AUTO: 5.77 K/UL
NEUTROPHILS NFR BLD AUTO: 76.4 %
NT-PROBNP SERPL-MCNC: 1616 PG/ML
PLATELET # BLD AUTO: 168 K/UL
POTASSIUM SERPL-SCNC: 4.4 MMOL/L
PREALB SERPL NEPH-MCNC: 36 MG/DL
PROT SERPL-MCNC: 6.5 G/DL
RBC # BLD: 5.11 M/UL
RBC # FLD: 14.2 %
SODIUM SERPL-SCNC: 142 MMOL/L
TRIGL SERPL-MCNC: 126 MG/DL
TROPONIN-T, HIGH SENSITIVITY: 27 NG/L
TSH SERPL-ACNC: 1.23 UIU/ML
WBC # FLD AUTO: 7.55 K/UL

## 2019-11-15 NOTE — HISTORY OF PRESENT ILLNESS
[FreeTextEntry1] : Patient is a very active 79 year-old gentleman with known history of chronic atrial fibrillation on direct oral anticoagulation whose recent echocardiogram (April 2019) showed severe diastolic dysfunction, concentric LVH, but whose EKG showed low voltage in the limb leads, leading to a cardiac MRI which showed extensive subendocardial, and RV free wall late gadolinium enhancement suggestive of cardiac amyloidosis (it also showed LVEF 23%).\par \par Patient exercises regularly. He does elliptical for 20-30 minutes and then does machines 3-4 times per week. He also plays golf regularly (2-3 times per week).\par \par Patient with spinal stenosis status post surgery several years ago.\par Patient has bicep tendon rupture (while playing golf).\par No history of carpal tunnel syndrome.\par \par Patient is currently maintained on diltiazem, metoprolol, and lisinopril. The lisinopril was recently uptitrated from 5 mg to 10 mg daily. He believes that a dry cough has started over the period of time since he increased the lisinopril dose.\par \par September 2019 - Patient stopped lisinopril after his first visit here. His cough has resolved and he is feeling better. Repeat echocardiogram with Dr. Neal showed LVEF 45-50% (LVEF on MRI was erroneously low). Patient's genetic testing (performed through [facility]) was negative for mutation. \par \par November 2019 - Patient presents today in his usual state of health. He saw Efren Perez MD this morning.

## 2019-11-15 NOTE — DISCUSSION/SUMMARY
[FreeTextEntry1] : Patient is a 79 year-old with cardiac MRI that is highly suggestive of cardiac amyloidosis.\par Serum free light chains were normal (July 2019).\par PYP scan confirmed TTR amyloidosis. 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 \par \par Patient saw Katia Perez MD for neurologic evaluation earlier today - no evidence of neuropathy seen. \par \par Patient feels better since stopping ACEi. At this time, will discontinue diltiazem.\par Patient to follow-up with his primary cardiologist (Cornelius Neal MD) as scheduled.\par \par Will continue TTR stabilizer, tafamidis, as it is the only medication currently approved for Mr. Hutchinson. If Vyndaqel is cost prohibitive, will consider referral for AG10 trial or eventual enrollment in [facility]

## 2019-11-15 NOTE — PHYSICAL EXAM
[General Appearance - Well Developed] : well developed [Normal Appearance] : normal appearance [Well Groomed] : well groomed [No Deformities] : no deformities [General Appearance - In No Acute Distress] : no acute distress [General Appearance - Well Nourished] : well nourished [Normal Oral Mucosa] : normal oral mucosa [Normal Oropharynx] : normal oropharynx [No Oral Pallor] : no oral pallor [No Oral Cyanosis] : no oral cyanosis [Normal Jugular Venous V Waves Present] : normal jugular venous V waves present [Normal Jugular Venous A Waves Present] : normal jugular venous A waves present [] : no respiratory distress [No Jugular Venous Beth A Waves] : no jugular venous beth A waves [Respiration, Rhythm And Depth] : normal respiratory rhythm and effort [Auscultation Breath Sounds / Voice Sounds] : lungs were clear to auscultation bilaterally [Exaggerated Use Of Accessory Muscles For Inspiration] : no accessory muscle use [Abdomen Tenderness] : non-tender [Bowel Sounds] : normal bowel sounds [Abdomen Hernia] : no hernia was discovered [Abnormal Walk] : normal gait [Gait - Sufficient For Exercise Testing] : the gait was sufficient for exercise testing [Nail Clubbing] : no clubbing of the fingernails [Cyanosis, Localized] : no localized cyanosis [Skin Color & Pigmentation] : normal skin color and pigmentation [No Venous Stasis] : no venous stasis [No Xanthoma] : no  xanthoma was observed [Oriented To Time, Place, And Person] : oriented to person, place, and time [Impaired Insight] : insight and judgment were intact [Mood] : the mood was normal [Affect] : the affect was normal [Conjunctiva] : the conjunctiva were normal in both eyes [No Anxiety] : not feeling anxious [Normal] : the eyelids were normal bilaterally [PERRL] : pupils were equal in size, round, and reactive to light [EOM Intact] : extraocular movements were intact [Irregularly Irregular] : irregularly irregular [No Murmur] : no murmurs heard [2+] : left 2+ [No Pitting Edema] : no pitting edema present [Yellow Sclera (Icteric)] : no scleral icterus was seen [Right Carotid Bruit] : no bruit heard over the right carotid [Left Carotid Bruit] : no bruit heard over the left carotid

## 2019-11-15 NOTE — REASON FOR VISIT
[Follow-Up - Clinic] : a clinic follow-up of [Spouse] : spouse [FreeTextEntry2] : cardiac amyloidosis

## 2019-12-24 ENCOUNTER — MEDICATION RENEWAL (OUTPATIENT)
Age: 79
End: 2019-12-24

## 2020-01-04 ENCOUNTER — MOBILE ON CALL (OUTPATIENT)
Age: 80
End: 2020-01-04

## 2020-04-03 ENCOUNTER — APPOINTMENT (OUTPATIENT)
Dept: CARDIOLOGY | Facility: CLINIC | Age: 80
End: 2020-04-03

## 2020-09-11 ENCOUNTER — NON-APPOINTMENT (OUTPATIENT)
Age: 80
End: 2020-09-11

## 2020-09-11 ENCOUNTER — APPOINTMENT (OUTPATIENT)
Dept: CARDIOLOGY | Facility: CLINIC | Age: 80
End: 2020-09-11
Payer: MEDICARE

## 2020-09-11 VITALS
HEIGHT: 72 IN | DIASTOLIC BLOOD PRESSURE: 68 MMHG | WEIGHT: 176 LBS | HEART RATE: 76 BPM | SYSTOLIC BLOOD PRESSURE: 132 MMHG | BODY MASS INDEX: 23.84 KG/M2 | OXYGEN SATURATION: 100 %

## 2020-09-11 PROCEDURE — 93000 ELECTROCARDIOGRAM COMPLETE: CPT

## 2020-09-11 PROCEDURE — 99215 OFFICE O/P EST HI 40 MIN: CPT

## 2020-09-11 NOTE — REVIEW OF SYSTEMS
[Negative] : Heme/Lymph [Fever] : no fever [Recent Weight Gain (___ Lbs)] : no recent weight gain [Headache] : no headache [Chills] : no chills [Recent Weight Loss (___ Lbs)] : no recent weight loss [Feeling Fatigued] : not feeling fatigued [Dyspnea on exertion] : not dyspnea during exertion [Shortness Of Breath] : no shortness of breath [Chest  Pressure] : no chest pressure [Chest Pain] : no chest pain [Leg Claudication] : no intermittent leg claudication [Lower Ext Edema] : no extremity edema [Palpitations] : no palpitations

## 2020-09-11 NOTE — DISCUSSION/SUMMARY
[Patient] : the patient [Benefits] : benefits [Risks] : risks [Alternatives] : alternatives [___ Month(s)] : [unfilled] month(s) [With Me] : with me [FreeTextEntry1] : Assessment and recommendations.\par #1 Nonischemic cardiomyopathy.\par TTR amyloid cardiomyopathy. Now being followed by Dr. Nunes.\par Class 2 functional status.  No signs of congestion.  No signs of low-flow state.\par Continue present regimen of medications.\par Labs ordered.  Which includes BNP level.\par Echocardiogram ordered for LV ejection fraction evaluation LV wall thickness pericardial space.\par # 2 essential hypertension.Much better controlled. Continue present regimen of medications.  Follow-up labs and prescription done.\par # 3 hyperlipidemia. On cholesterol-lowering agent. Abnormal liver function tests in the past. Negative hepatitis screen. 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  Follow-up labs ordered.  Prescription done.\par #5 moderate mitral regurgitation. No signs of congestive heart failure. Thoracic aortic dilation.  Follow-up echocardiogram.\par #6 chronic atrial fibrillation .  Ventricular rate well controlled.  Continue with metoprolol.  He'll continue with anticoagulation. Watch for bleeding.  High risk medication use.\par \par \par Counseling regarding low saturated fat, low carbohydrate intake was reviewed. Active lifestyle and regular. Exercise is along with weight maintenance is advised.\par All the above were at length reviewed. Answered all the questions. Thank you very much for this kind referral. Please do not hesitate to give me a call for any question.\par Part of this transcription was done with voice recognition software and phonetically similar errors are common. I apologize for that. Please donot hesitate to call for any questions due to above.\par \par He will have repeat BMP checked on spironolactone. I be happy to see him again in one month.\par

## 2020-09-11 NOTE — ASSESSMENT
[FreeTextEntry1] : Reviewed on October 2, 2018.\par EKG ordered and interpreted by me October 2, 2018 indications atrial fibrillation. Interpretation atrial fibrillation. Right axis deviation. Nonspecific IVCD. No new change compared to before\par \par past tests for reference\par Echocardiogram  which was done on April 5, 2017, PA EF was 58%. Moderate mitral the decision. Ascending and ordered Dilantin 4 cm aortic arch difficult to visualize possible dilation at 4.6 cm. Moderate mitral regurgitation noted\par CTA;4/12/17: ascending aorta: 3.8 cm.\par Echocardiogram. Reviewed April 2, 2018.\par His ejection fraction around 50%. LVH noted\par Labs from January 2018 were reviewed\par \par \par Cardiac MRI, May 24, 2019. Reviewed today. Was not available to today to review. LV ejection fraction 23% via an ejection fraction 23%. 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 Also possibility of other nonischemic etiologies.\par Labs  May 1, 2019 reviewed. ESR 4 iron studies were normal. TSH 1.85.\par Titers were negative. Serum electrophoresis no significant M spike.\par Reviewed on April 30, 2019\par Recent labs from April 9, 2019\par Showed stable CBC, CMP, and lipid panel.\par Echocardiogram April 18, 2019 showed an ejection fraction around 50%. There is mild to moderate concentric LVH. There is biatrial enlargement. The aortic dimension 4.1 cm. Severe diastolic dysfunction. Mild to moderate mitral and tricuspid regurgitation. Pulmonary artery systolic pressure around 45 mm mercury. There was no pericardial effusion noted\par \par Reviewed on September 18, 2019\par Labs from September 12, 2019 creatinine 0.95, potassium 4.7, sodium 142, glucose 96.\par Echocardiogram August 27, 2019 and ejection fraction 45-50%. Concentric LVH. Normal longitudinal strain at the apex. Global longitudinal strain reduced suggestive of diastolic dysfunction. Moderate concentric LVH. Significant right atrial enlargement.\par \par Reviewed September 11, 2020\par EKG as noted above\par Last labs are from November 2019

## 2020-09-11 NOTE — REASON FOR VISIT
[Follow-Up - Clinic] : a clinic follow-up of [Cardiomyopathy] : cardiomyopathy [Atrial Fibrillation] : atrial fibrillation [Hyperlipidemia] : hyperlipidemia [Hypertension] : hypertension [Mitral Regurgitation] : mitral regurgitation [FreeTextEntry1] : 80-year-old gentleman comes in for follow-up consultation while being treated for T TR/amyloid cardiomyopathy.\par He is tolerating his Spironolactone, metoprolol very well.  He is off diltiazem.\par He offers no change in his symptoms. He is feeling very well and has been playing golf without any significant symptoms.\par He has no chest pain, PND, orthopnea.\par He has no dizziness, lightheadedness, or palpitations.\par He has no bleeding diathesis.\par He has no significant headache, visual disturbances, focal weakness.\par He has no recent hospitalization \par No complaint of neuropathy symptoms\par He has been stable on TTR cardiomyopathy medication ( Vyndaquel)

## 2020-09-11 NOTE — PHYSICAL EXAM
[General Appearance - Well Developed] : well developed [Normal Appearance] : normal appearance [General Appearance - Well Nourished] : well nourished [Well Groomed] : well groomed [General Appearance - In No Acute Distress] : no acute distress [No Deformities] : no deformities [No Oral Pallor] : no oral pallor [Normal Conjunctiva] : the conjunctiva exhibited no abnormalities [Normal Jugular Venous V Waves Present] : normal jugular venous V waves present [Normal Jugular Venous A Waves Present] : normal jugular venous A waves present [No Jugular Venous Beth A Waves] : no jugular venous beth A waves [Auscultation Breath Sounds / Voice Sounds] : lungs were clear to auscultation bilaterally [Respiration, Rhythm And Depth] : normal respiratory rhythm and effort [Exaggerated Use Of Accessory Muscles For Inspiration] : no accessory muscle use [Arterial Pulses Normal] : the arterial pulses were normal [Abdomen Soft] : soft [Edema] : no peripheral edema present [Abnormal Walk] : normal gait [Gait - Sufficient For Exercise Testing] : the gait was sufficient for exercise testing [Nail Clubbing] : no clubbing of the fingernails [Cyanosis, Localized] : no localized cyanosis [] : no rash [Skin Color & Pigmentation] : normal skin color and pigmentation [Skin Lesions] : no skin lesions [No Venous Stasis] : no venous stasis [No Xanthoma] : no  xanthoma was observed [No Skin Ulcers] : no skin ulcer [Affect] : the affect was normal [Oriented To Time, Place, And Person] : oriented to person, place, and time [Mood] : the mood was normal [No Anxiety] : not feeling anxious [FreeTextEntry1] : irregular irregular hsm 1-2/6 , no gallop/rub/heave or click

## 2020-09-16 ENCOUNTER — APPOINTMENT (OUTPATIENT)
Dept: CARDIOLOGY | Facility: CLINIC | Age: 80
End: 2020-09-16
Payer: MEDICARE

## 2020-09-16 ENCOUNTER — TRANSCRIPTION ENCOUNTER (OUTPATIENT)
Age: 80
End: 2020-09-16

## 2020-09-16 PROCEDURE — 93306 TTE W/DOPPLER COMPLETE: CPT

## 2020-12-14 ENCOUNTER — APPOINTMENT (OUTPATIENT)
Dept: CARDIOLOGY | Facility: CLINIC | Age: 80
End: 2020-12-14

## 2021-01-25 RX ORDER — TAFAMIDIS MEGLUMINE 20 MG/1
20 CAPSULE, LIQUID FILLED ORAL DAILY
Qty: 120 | Refills: 6 | Status: DISCONTINUED | COMMUNITY
Start: 2019-09-23 | End: 2021-01-25

## 2021-09-17 ENCOUNTER — APPOINTMENT (OUTPATIENT)
Dept: CARDIOLOGY | Facility: CLINIC | Age: 81
End: 2021-09-17
Payer: MEDICARE

## 2021-09-17 ENCOUNTER — NON-APPOINTMENT (OUTPATIENT)
Age: 81
End: 2021-09-17

## 2021-09-17 VITALS
HEART RATE: 70 BPM | HEIGHT: 72 IN | DIASTOLIC BLOOD PRESSURE: 70 MMHG | TEMPERATURE: 97 F | BODY MASS INDEX: 23.84 KG/M2 | OXYGEN SATURATION: 100 % | SYSTOLIC BLOOD PRESSURE: 120 MMHG | WEIGHT: 176 LBS

## 2021-09-17 DIAGNOSIS — I10 ESSENTIAL (PRIMARY) HYPERTENSION: ICD-10-CM

## 2021-09-17 PROCEDURE — 99215 OFFICE O/P EST HI 40 MIN: CPT

## 2021-09-17 PROCEDURE — 93000 ELECTROCARDIOGRAM COMPLETE: CPT

## 2021-09-17 NOTE — CARDIOLOGY SUMMARY
[___] : [unfilled] [LVEF ___%] : LVEF [unfilled]% [Mild] : mild pulmonary hypertension [de-identified] : 9/17/2021 atrial fibrillation.  Rightward axis.  Inferolateral ST-T changes.

## 2021-09-17 NOTE — REASON FOR VISIT
[Symptom and Test Evaluation] : symptom and test evaluation [Structural Heart and Valve Disease] : structural heart and valve disease [FreeTextEntry1] : 81-year-old gentleman comes in for follow-up consultation while being treated for TTR/amyloid cardiomyopathy.  On Vyndamax.\par He is tolerating his Spironolactone, metoprolol very well. \par He offers no change in his symptoms.  He is class I functional status with good exercise tolerance.  He rides his bicycle to the gym.  Does elliptical walk on a treadmill.  He plays golf regularly.\par He has no chest pain, PND, orthopnea.\par He has no dizziness, lightheadedness, or palpitations.\par He has no bleeding diathesis.\par He has no significant headache, visual disturbances, focal weakness.\par He has no recent hospitalization \par No complaint of neuropathy symptoms\par  [FreeTextEntry3] : Dr. Burgess

## 2021-09-17 NOTE — DISCUSSION/SUMMARY
[Patient] : the patient [Risks] : risks [Alternatives] : alternatives [Benefits] : benefits [With Me] : with me [___ Month(s)] : in [unfilled] month(s) [FreeTextEntry1] : Assessment and recommendations.\par #1 Nonischemic cardiomyopathy.  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  No signs of congestion.  No signs of low-flow state.\par Continue present regimen of medications.\par Labs ordered.  Which includes BNP level.\par Echocardiogram ordered for LV ejection fraction evaluation LV wall thickness pericardial space.\par Follow-up with heart failure specialist locally. dr. Calderon\par # 2 essential hypertension.Much better controlled. Continue present regimen of medications.  Follow-up labs and prescription done.\par # 3 hyperlipidemia. On cholesterol-lowering agent. Abnormal liver function tests in the past. Negative hepatitis screen. 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  Follow-up labs ordered.  Prescription done.\par #5 moderate mitral regurgitation. No signs of congestive heart failure. \par Thoracic aortic dilation.  Follow-up echocardiogram.  Continue blood pressure heart rate control\par #6 chronic atrial fibrillation .  Ventricular rate well controlled.  Continue with metoprolol.  He'll continue with anticoagulation. Watch for bleeding.  High risk medication use.  Stable without complications\par \par \par Counseling regarding low saturated fat, low carbohydrate intake was reviewed. Active lifestyle and regular. Exercise is along with weight maintenance is advised.\par All the above were at length reviewed. Answered all the questions. Thank you very much for this kind referral. Please do not hesitate to give me a call for any question.\par Part of this transcription was done with voice recognition software and phonetically similar errors are common. I apologize for that. Please donot hesitate to call for any questions due to above.\par Follow-up on a regular basis.  Labs to be done in 4 to 6 months again.\par Review echocardiogram when done on the phone he will contact me for appropriate review.  I will see him when he comes back from [place].\par

## 2021-09-17 NOTE — ASSESSMENT
[FreeTextEntry1] : Reviewed on October 2, 2018.\par EKG ordered and interpreted by me October 2, 2018 indications atrial fibrillation. Interpretation atrial fibrillation. Right axis deviation. Nonspecific IVCD. No new change compared to before\par \par past tests for reference\par Echocardiogram  which was done on April 5, 2017, PA EF was 58%. Moderate mitral the decision. Ascending and ordered Dilantin 4 cm aortic arch difficult to visualize possible dilation at 4.6 cm. Moderate mitral regurgitation noted\par CTA;4/12/17: ascending aorta: 3.8 cm.\par Echocardiogram. Reviewed April 2, 2018.\par His ejection fraction around 50%. LVH noted\par Labs from January 2018 were reviewed\par \par \par Cardiac MRI, May 24, 2019. Reviewed today. Was not available to today to review. LV ejection fraction 23% via an ejection fraction 23%. 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 Also possibility of other nonischemic etiologies.\par Labs  May 1, 2019 reviewed. ESR 4 iron studies were normal. TSH 1.85.\par Titers were negative. Serum electrophoresis no significant M spike.\par Reviewed on April 30, 2019\par Recent labs from April 9, 2019\par Showed stable CBC, CMP, and lipid panel.\par Echocardiogram April 18, 2019 showed an ejection fraction around 50%. There is mild to moderate concentric LVH. There is biatrial enlargement. The aortic dimension 4.1 cm. Severe diastolic dysfunction. Mild to moderate mitral and tricuspid regurgitation. Pulmonary artery systolic pressure around 45 mm mercury. There was no pericardial effusion noted\par \par Reviewed on September 18, 2019\par Labs from September 12, 2019 creatinine 0.95, potassium 4.7, sodium 142, glucose 96.\par Echocardiogram August 27, 2019 and ejection fraction 45-50%. Concentric LVH. Normal longitudinal strain at the apex. Global longitudinal strain reduced suggestive of diastolic dysfunction. Moderate concentric LVH. Significant right atrial enlargement.\par \par Reviewed September 11, 2020\par EKG as noted above\par Last labs are from November 2019\par \par Reviewed 9/17/2021.\par Labs 9/9/2021.  Total cholesterol 134 HDL 46 LDL 72 sodium 140 potassium 4.4 creatinine 1.08 LFTs stable TSH 1.63 CBC stable.\par EKG as noted above

## 2021-09-17 NOTE — PHYSICAL EXAM
[General Appearance - Well Developed] : well developed [Normal Appearance] : normal appearance [Well Groomed] : well groomed [General Appearance - Well Nourished] : well nourished [No Deformities] : no deformities [General Appearance - In No Acute Distress] : no acute distress [Normal Conjunctiva] : the conjunctiva exhibited no abnormalities [No Oral Pallor] : no oral pallor [Normal Jugular Venous A Waves Present] : normal jugular venous A waves present [Normal Jugular Venous V Waves Present] : normal jugular venous V waves present [No Jugular Venous Beth A Waves] : no jugular venous beth A waves [Respiration, Rhythm And Depth] : normal respiratory rhythm and effort [Exaggerated Use Of Accessory Muscles For Inspiration] : no accessory muscle use [Auscultation Breath Sounds / Voice Sounds] : lungs were clear to auscultation bilaterally [Arterial Pulses Normal] : the arterial pulses were normal [Edema] : no peripheral edema present [Abdomen Soft] : soft [Gait - Sufficient For Exercise Testing] : the gait was sufficient for exercise testing [Abnormal Walk] : normal gait [Nail Clubbing] : no clubbing of the fingernails [Cyanosis, Localized] : no localized cyanosis [Skin Color & Pigmentation] : normal skin color and pigmentation [No Venous Stasis] : no venous stasis [] : no rash [Skin Lesions] : no skin lesions [No Xanthoma] : no  xanthoma was observed [No Skin Ulcers] : no skin ulcer [Oriented To Time, Place, And Person] : oriented to person, place, and time [Affect] : the affect was normal [Mood] : the mood was normal [No Anxiety] : not feeling anxious [FreeTextEntry1] : irregular irregular hsm 1-2/6 , no gallop/rub/heave or click.  No JVD.

## 2021-09-30 ENCOUNTER — APPOINTMENT (OUTPATIENT)
Dept: CARDIOLOGY | Facility: CLINIC | Age: 81
End: 2021-09-30
Payer: MEDICARE

## 2021-09-30 PROCEDURE — 93306 TTE W/DOPPLER COMPLETE: CPT

## 2021-10-22 ENCOUNTER — APPOINTMENT (OUTPATIENT)
Dept: HEART FAILURE | Facility: CLINIC | Age: 81
End: 2021-10-22
Payer: MEDICARE

## 2021-10-22 VITALS
OXYGEN SATURATION: 98 % | HEIGHT: 72 IN | BODY MASS INDEX: 23.84 KG/M2 | DIASTOLIC BLOOD PRESSURE: 68 MMHG | SYSTOLIC BLOOD PRESSURE: 104 MMHG | HEART RATE: 68 BPM | TEMPERATURE: 97 F | WEIGHT: 176 LBS

## 2021-10-22 PROCEDURE — 99204 OFFICE O/P NEW MOD 45 MIN: CPT

## 2021-10-22 RX ORDER — OFLOXACIN 3 MG/ML
0.3 SOLUTION/ DROPS OPHTHALMIC
Qty: 5 | Refills: 0 | Status: DISCONTINUED | COMMUNITY
Start: 2021-08-01 | End: 2021-10-22

## 2021-10-22 NOTE — ASSESSMENT
[FreeTextEntry1] : 81 year old male with history of HTN, HLD, Afib, HFpEF, TTR amyloid on vyndymax who comes in to establish care with heart failure.\par \par #Cardiac Amyloid: Patient is doing very well and is NYHA class 1-2. He is tolerating the vyndymax well and is able to exercise on a daily basis. he is currently on metoprolol tartrate and spironolactone, he was not able to tolerate an ACEi in the past due to cough. He previously had low EF but more recently has an EF of 50-55%. Given the lack of evidence of GDMT in patient with cardiac, will continue with current medications without any changes. If his EF drops, will consider adding on some low dose GDMT\par - c/w metoprolol and spironolactone\par - c/w vyndymax\par \par #Atrial Fibrillation: rate controlled\par - c/w metoprolol and eliquis

## 2021-10-22 NOTE — PHYSICAL EXAM
[Well Developed] : well developed [Well Nourished] : well nourished [No Acute Distress] : no acute distress [Normal Conjunctiva] : normal conjunctiva [Normal Venous Pressure] : normal venous pressure [Normal S1, S2] : normal S1, S2 [No Murmur] : no murmur [No Rub] : no rub [Clear Lung Fields] : clear lung fields [Good Air Entry] : good air entry [No Respiratory Distress] : no respiratory distress  [Soft] : abdomen soft [Non Tender] : non-tender [No Masses/organomegaly] : no masses/organomegaly [Normal Gait] : normal gait [No Edema] : no edema [No Cyanosis] : no cyanosis [No Rash] : no rash [Moves all extremities] : moves all extremities

## 2021-10-22 NOTE — CARDIOLOGY SUMMARY
[de-identified] :  AFib, low voltage in the limb leads, poor R-wave progression  [de-identified] :  9/2021: EF 50-55%, severely dilated LA, , mild MR, normal RV function  [de-identified] : 8/12/2019 - PYP scan consistent with TTR amyloidosis (grade 3, heart to lung ratio 1.52)   [de-identified] : Cardiac MRI: 5/24/2019 - severely reduced LVEF 23%, severely dilated atria, extensive LGE involving entire endocardium of LV, interventricular septum, and RV free wall, consistent with amyloidosis

## 2022-01-04 ENCOUNTER — RX RENEWAL (OUTPATIENT)
Age: 82
End: 2022-01-04

## 2022-08-04 ENCOUNTER — APPOINTMENT (OUTPATIENT)
Dept: CARDIOLOGY | Facility: CLINIC | Age: 82
End: 2022-08-04

## 2022-08-04 ENCOUNTER — NON-APPOINTMENT (OUTPATIENT)
Age: 82
End: 2022-08-04

## 2022-08-04 VITALS
SYSTOLIC BLOOD PRESSURE: 110 MMHG | HEART RATE: 76 BPM | OXYGEN SATURATION: 100 % | WEIGHT: 176 LBS | HEIGHT: 72 IN | BODY MASS INDEX: 23.84 KG/M2 | DIASTOLIC BLOOD PRESSURE: 70 MMHG

## 2022-08-04 DIAGNOSIS — I42.9 CARDIOMYOPATHY, UNSPECIFIED: ICD-10-CM

## 2022-08-04 PROCEDURE — 93000 ELECTROCARDIOGRAM COMPLETE: CPT

## 2022-08-04 PROCEDURE — 99214 OFFICE O/P EST MOD 30 MIN: CPT

## 2022-08-04 RX ORDER — MUPIROCIN 20 MG/G
2 OINTMENT TOPICAL
Qty: 22 | Refills: 0 | Status: DISCONTINUED | COMMUNITY
Start: 2021-06-10 | End: 2022-08-04

## 2022-08-04 RX ORDER — DICLOFENAC SODIUM 1% 10 MG/G
1 GEL TOPICAL
Qty: 300 | Refills: 0 | Status: DISCONTINUED | COMMUNITY
Start: 2021-08-16 | End: 2022-08-04

## 2022-08-04 NOTE — PHYSICAL EXAM
[General Appearance - Well Developed] : well developed [Normal Appearance] : normal appearance [Well Groomed] : well groomed [General Appearance - Well Nourished] : well nourished [No Deformities] : no deformities [General Appearance - In No Acute Distress] : no acute distress [Normal Jugular Venous A Waves Present] : normal jugular venous A waves present [Normal Jugular Venous V Waves Present] : normal jugular venous V waves present [No Jugular Venous Beth A Waves] : no jugular venous beth A waves [] : no respiratory distress [Respiration, Rhythm And Depth] : normal respiratory rhythm and effort [Exaggerated Use Of Accessory Muscles For Inspiration] : no accessory muscle use [Auscultation Breath Sounds / Voice Sounds] : lungs were clear to auscultation bilaterally [Arterial Pulses Normal] : the arterial pulses were normal [Edema] : no peripheral edema present [Nail Clubbing] : no clubbing of the fingernails [Cyanosis, Localized] : no localized cyanosis [FreeTextEntry1] : Arthritic gait

## 2022-08-04 NOTE — CARDIOLOGY SUMMARY
[___] : [unfilled] [LVEF ___%] : LVEF [unfilled]% [Mild] : mild pulmonary hypertension [de-identified] : 9/17/2021 atrial fibrillation.  Rightward axis.  Inferolateral ST-T changes.\par August 4, 2022 atrial fibrillation.  Nonspecific ST-T changes

## 2022-08-04 NOTE — REASON FOR VISIT
[Symptom and Test Evaluation] : symptom and test evaluation [Structural Heart and Valve Disease] : structural heart and valve disease [FreeTextEntry3] : Dr. Burgess [FreeTextEntry1] : 82-year-old gentleman comes in for follow-up consultation while being treated for TTR/amyloid cardiomyopathy.  On Vyndamax.\par He is tolerating his Spironolactone, metoprolol very well. \par Main complaint is spinal stenosis.  Being treated.  With physical therapy he has started playing golf again.  Tries to walk as much as possible.\par He has no chest pain, PND, orthopnea.\par He has no dizziness, lightheadedness, or palpitations.\par He has no bleeding diathesis.\par He has no significant headache, visual disturbances, focal weakness.\par He has no recent hospitalization \par No complaint of neuropathy symptoms\par No tendinopathy.  No bleeding\par

## 2022-08-04 NOTE — ASSESSMENT
[FreeTextEntry1] : Reviewed on October 2, 2018.\par EKG ordered and interpreted by me October 2, 2018 indications atrial fibrillation. Interpretation atrial fibrillation. Right axis deviation. Nonspecific IVCD. No new change compared to before\par \par past tests for reference\par Echocardiogram  which was done on April 5, 2017, PA EF was 58%. Moderate mitral the decision. Ascending and ordered Dilantin 4 cm aortic arch difficult to visualize possible dilation at 4.6 cm. Moderate mitral regurgitation noted\par CTA;4/12/17: ascending aorta: 3.8 cm.\par Echocardiogram. Reviewed April 2, 2018.\par His ejection fraction around 50%. LVH noted\par Labs from January 2018 were reviewed\par \par \par Cardiac MRI, May 24, 2019. Reviewed today. Was not available to today to review. LV ejection fraction 23% via an ejection fraction 23%. 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 Also possibility of other nonischemic etiologies.\par Labs  May 1, 2019 reviewed. ESR 4 iron studies were normal. TSH 1.85.\par Titers were negative. Serum electrophoresis no significant M spike.\par Reviewed on April 30, 2019\par Recent labs from April 9, 2019\par Showed stable CBC, CMP, and lipid panel.\par Echocardiogram April 18, 2019 showed an ejection fraction around 50%. There is mild to moderate concentric LVH. There is biatrial enlargement. The aortic dimension 4.1 cm. Severe diastolic dysfunction. Mild to moderate mitral and tricuspid regurgitation. Pulmonary artery systolic pressure around 45 mm mercury. There was no pericardial effusion noted\par \par Reviewed on September 18, 2019\par Labs from September 12, 2019 creatinine 0.95, potassium 4.7, sodium 142, glucose 96.\par Echocardiogram August 27, 2019 and ejection fraction 45-50%. Concentric LVH. Normal longitudinal strain at the apex. Global longitudinal strain reduced suggestive of diastolic dysfunction. Moderate concentric LVH. Significant right atrial enlargement.\par \par Reviewed September 11, 2020\par EKG as noted above\par Last labs are from November 2019\par \par Reviewed 9/17/2021.\par Labs 9/9/2021.  Total cholesterol 134 HDL 46 LDL 72 sodium 140 potassium 4.4 creatinine 1.08 LFTs stable TSH 1.63 CBC stable.\par EKG as noted above\par \par Reviewed on August 4, 2022\par EKG as noted above.\par Echocardiogram September 30, 2021 EF 50 to 55% mild mitral regurgitation.  Severely dilated left atrium.  Mild global LV systolic dysfunction.  Moderate concentric LVH moderate right atrial enlargement.  RV enlargement.  RVSP 32 mmHg no pericardial effusion

## 2022-08-04 NOTE — DISCUSSION/SUMMARY
[Patient] : the patient [Risks] : risks [Benefits] : benefits [Alternatives] : alternatives [With Me] : with me [___ Month(s)] : in [unfilled] month(s) [FreeTextEntry1] : Assessment and recommendations.\par #1 Nonischemic cardiomyopathy.  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  No signs of congestion.  No signs of low-flow state.\par Continue present regimen of medications.\par Labs ordered.  Which includes BNP level.\par Echocardiogram ordered for LV ejection fraction evaluation LV wall thickness pericardial space.\par If worsening LV systolic function we can consider addition of ACE inhibitor/ARB or Entresto to the present regimen for low-dose GDMT as tolerated.\par # 2 essential hypertension.Much better controlled. Continue present regimen of medications.  Follow-up labs and prescription done.\par # 3 hyperlipidemia. On cholesterol-lowering agent. Abnormal liver function tests in the past. Negative hepatitis screen. 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  \par #5  Tricuspid and mitral regurgitation. No signs of congestive heart failure. \par Thoracic aortic dilation.  Follow-up echocardiogram.  Continue blood pressure heart rate control\par #6 chronic atrial fibrillation .  Ventricular rate well controlled.  Left atrial enlargement.  Continue with metoprolol.  He'll continue with anticoagulation. Watch for bleeding.  High risk medication use.  Stable without complications\par \par \par Counseling regarding low saturated fat, low carbohydrate intake was reviewed. Active lifestyle and regular. Exercise is along with weight maintenance is advised.\par All the above were at length reviewed. Answered all the questions. Thank you very much for this kind referral. Please do not hesitate to give me a call for any question.\par Part of this transcription was done with voice recognition software and phonetically similar errors are common. I apologize for that. Please donot hesitate to call for any questions due to above.\par \par Sincerely,\par Cornelius Neal MD,FACC,POLLYE\par \par

## 2022-09-27 ENCOUNTER — APPOINTMENT (OUTPATIENT)
Dept: CARDIOLOGY | Facility: CLINIC | Age: 82
End: 2022-09-27

## 2022-09-27 PROCEDURE — 93306 TTE W/DOPPLER COMPLETE: CPT

## 2022-10-07 ENCOUNTER — APPOINTMENT (OUTPATIENT)
Dept: CARDIOLOGY | Facility: CLINIC | Age: 82
End: 2022-10-07

## 2022-10-07 VITALS
HEIGHT: 72 IN | HEART RATE: 94 BPM | DIASTOLIC BLOOD PRESSURE: 68 MMHG | OXYGEN SATURATION: 97 % | SYSTOLIC BLOOD PRESSURE: 118 MMHG | WEIGHT: 181 LBS | BODY MASS INDEX: 24.52 KG/M2

## 2022-10-07 PROCEDURE — 99214 OFFICE O/P EST MOD 30 MIN: CPT

## 2022-10-07 RX ORDER — ERYTHROMYCIN 5 MG/G
5 OINTMENT OPHTHALMIC
Qty: 4 | Refills: 0 | Status: DISCONTINUED | COMMUNITY
Start: 2022-07-08 | End: 2022-10-07

## 2022-10-07 NOTE — CARDIOLOGY SUMMARY
[___] : [unfilled] [LVEF ___%] : LVEF [unfilled]% [Mild] : mild pulmonary hypertension [de-identified] : 9/17/2021 atrial fibrillation.  Rightward axis.  Inferolateral ST-T changes.\par August 4, 2022 atrial fibrillation.  Nonspecific ST-T changes [de-identified] : September 27, 2022 LVEF 50 to 55% mild mitral regurgitation severely dilated left atrium LVH.  Biatrial enlargement.

## 2022-10-07 NOTE — ASSESSMENT
[FreeTextEntry1] : Reviewed on October 2, 2018.\par EKG ordered and interpreted by me October 2, 2018 indications atrial fibrillation. Interpretation atrial fibrillation. Right axis deviation. Nonspecific IVCD. No new change compared to before\par \par past tests for reference\par Echocardiogram  which was done on April 5, 2017, PA EF was 58%. Moderate mitral the decision. Ascending and ordered Dilantin 4 cm aortic arch difficult to visualize possible dilation at 4.6 cm. Moderate mitral regurgitation noted\par CTA;4/12/17: ascending aorta: 3.8 cm.\par Echocardiogram. Reviewed April 2, 2018.\par His ejection fraction around 50%. LVH noted\par Labs from January 2018 were reviewed\par \par \par Cardiac MRI, May 24, 2019. Reviewed today. Was not available to today to review. LV ejection fraction 23% via an ejection fraction 23%. 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 Also possibility of other nonischemic etiologies.\par Labs  May 1, 2019 reviewed. ESR 4 iron studies were normal. TSH 1.85.\par Titers were negative. Serum electrophoresis no significant M spike.\par Reviewed on April 30, 2019\par Recent labs from April 9, 2019\par Showed stable CBC, CMP, and lipid panel.\par Echocardiogram April 18, 2019 showed an ejection fraction around 50%. There is mild to moderate concentric LVH. There is biatrial enlargement. The aortic dimension 4.1 cm. Severe diastolic dysfunction. Mild to moderate mitral and tricuspid regurgitation. Pulmonary artery systolic pressure around 45 mm mercury. There was no pericardial effusion noted\par \par Reviewed on September 18, 2019\par Labs from September 12, 2019 creatinine 0.95, potassium 4.7, sodium 142, glucose 96.\par Echocardiogram August 27, 2019 and ejection fraction 45-50%. Concentric LVH. Normal longitudinal strain at the apex. Global longitudinal strain reduced suggestive of diastolic dysfunction. Moderate concentric LVH. Significant right atrial enlargement.\par \par Reviewed September 11, 2020\par EKG as noted above\par Last labs are from November 2019\par \par Reviewed 9/17/2021.\par Labs 9/9/2021.  Total cholesterol 134 HDL 46 LDL 72 sodium 140 potassium 4.4 creatinine 1.08 LFTs stable TSH 1.63 CBC stable.\par EKG as noted above\par \par Reviewed on August 4, 2022\par EKG as noted above.\par Echocardiogram September 30, 2021 EF 50 to 55% mild mitral regurgitation.  Severely dilated left atrium.  Mild global LV systolic dysfunction.  Moderate concentric LVH moderate right atrial enlargement.  RV enlargement.  RVSP 32 mmHg no pericardial effusion\par \par Reviewed on October 7, 2022\par Labs from October 3, 2022 normal CBC sodium 140 potassium 5.1 creatinine 0.98 LFT normal LDL 63 HDL 46 triglycerides 71 total cholesterol 120 N-terminal proBNP 1317

## 2022-10-07 NOTE — DISCUSSION/SUMMARY
[Patient] : the patient [Risks] : risks [Benefits] : benefits [Alternatives] : alternatives [With Me] : with me [FreeTextEntry1] : Assessment and recommendations.\par #1 Nonischemic cardiomyopathy.  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  No signs of congestion.  No signs of low-flow state.\par Continue present regimen of medications.\par Labs ordered.  Which includes BNP level.\par Stable echocardiogram\par # 2 essential hypertension.Much better controlled. Continue present regimen of medications.  Follow-up labs and prescription done.\par # 3 hyperlipidemia. On cholesterol-lowering agent. Abnormal liver function tests in the past. Negative hepatitis screen. 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  \par #5  Tricuspid and mitral regurgitation. No signs of congestive heart failure. \par Thoracic aortic dilation.  Follow-up echocardiogram.  Continue blood pressure heart rate control\par #6 chronic atrial fibrillation .  Ventricular rate well controlled.  Left atrial enlargement.  Continue with metoprolol.  He'll continue with anticoagulation. Watch for bleeding.  High risk medication use.  Stable without complications\par \par \par Counseling regarding low saturated fat, low carbohydrate intake was reviewed. Active lifestyle and regular. Exercise is along with weight maintenance is advised.\par All the above were at length reviewed. Answered all the questions. Thank you very much for this kind referral. Please do not hesitate to give me a call for any question.\par Part of this transcription was done with voice recognition software and phonetically similar errors are common. I apologize for that. Please donot hesitate to call for any questions due to above.\par \par Sincerely,\par Cornelius Neal MD,FACC,MARCEL\par \par

## 2022-10-28 ENCOUNTER — APPOINTMENT (OUTPATIENT)
Dept: HEART FAILURE | Facility: CLINIC | Age: 82
End: 2022-10-28

## 2022-10-28 VITALS
HEIGHT: 72 IN | HEART RATE: 51 BPM | SYSTOLIC BLOOD PRESSURE: 102 MMHG | TEMPERATURE: 96.8 F | DIASTOLIC BLOOD PRESSURE: 62 MMHG | OXYGEN SATURATION: 99 % | WEIGHT: 183 LBS | BODY MASS INDEX: 24.79 KG/M2

## 2022-10-28 PROCEDURE — 99214 OFFICE O/P EST MOD 30 MIN: CPT

## 2022-10-28 NOTE — HISTORY OF PRESENT ILLNESS
[FreeTextEntry1] : 81 year old male with history of HTN, HLD, Afib, HFpEF, TTR amyloid on vyndymax who comes in to establish care with heart failure. The patient follows with general cardiology (Dr. Neal) and cardiac amyloid specialist (Dr. Nunes). The patient was diagnosed with TTR amyloid 2-3 year ago when he presented with lower extremity edema. He had an echocardiogram in 2018 which showed an EF of 30-35%. He ended up undergoing a workup including a PYP scan which showed cardiac amyloid and cardiac MRI which showed findings consistent with cardiac amyloid and an EF of 23%. He is currently on metoprolol and spironolactone, he was note able to tolerate an ACEI. \par His latest EF is now 50-55%, dilated RV with normal RV function.\par He states that since starting vyndymax he feels very good. He rides his bike 4-5 miles almost daily and does not feel limited functionally\par \par 10/28/22: Patients states he is feeling quite well. He is able to perform all his ADLS plau more. He works out in the gym 4 times a week and rides on the stationary bike. He denies chest pain, SOB, BECERRA, PND, orthopnea, lower extremity edema\par \par

## 2022-10-28 NOTE — ASSESSMENT
[FreeTextEntry1] : 81 year old male with history of HTN, HLD, Afib, HFpEF, TTR amyloid on vyndymax who comes in to establish care with heart failure.\par \par #Cardiac Amyloid: Patient is doing very well and is NYHA class 1. 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 I told him to let me know if it worsens and we can reconsider a referral for this therapy. Patient is not interested at this time anyways\par \par #Atrial Fibrillation: rate controlled\par - c/w metoprolol and eliquis

## 2022-10-28 NOTE — CARDIOLOGY SUMMARY
[de-identified] :  AFib, low voltage in the limb leads, poor R-wave progression  [de-identified] : 9/2022: LVEF 50-55%, LVDD 4.4, mild MR, nomral RV function, dilated right and left atrium, mild TR\par  9/2021: EF 50-55%, severely dilated LA, , mild MR, normal RV function  [de-identified] : 8/12/2019 - PYP scan consistent with TTR amyloidosis (grade 3, heart to lung ratio 1.52)   [de-identified] : Cardiac MRI: 5/24/2019 - severely reduced LVEF 23%, severely dilated atria, extensive LGE involving entire endocardium of LV, interventricular septum, and RV free wall, consistent with amyloidosis

## 2023-03-10 ENCOUNTER — RX RENEWAL (OUTPATIENT)
Age: 83
End: 2023-03-10

## 2023-05-01 ENCOUNTER — APPOINTMENT (OUTPATIENT)
Dept: CARDIOLOGY | Facility: CLINIC | Age: 83
End: 2023-05-01
Payer: MEDICARE

## 2023-05-01 VITALS
HEART RATE: 71 BPM | WEIGHT: 179 LBS | HEIGHT: 72 IN | DIASTOLIC BLOOD PRESSURE: 66 MMHG | OXYGEN SATURATION: 95 % | SYSTOLIC BLOOD PRESSURE: 120 MMHG | BODY MASS INDEX: 24.24 KG/M2

## 2023-05-01 PROCEDURE — 99214 OFFICE O/P EST MOD 30 MIN: CPT

## 2023-05-01 RX ORDER — MULTIVIT-MIN/FOLIC/VIT K/LYCOP 400-300MCG
50 MCG TABLET ORAL
Refills: 0 | Status: ACTIVE | COMMUNITY

## 2023-05-01 NOTE — REASON FOR VISIT
[Symptom and Test Evaluation] : symptom and test evaluation [Structural Heart and Valve Disease] : structural heart and valve disease [FreeTextEntry3] : Dr. Burgess [FreeTextEntry1] : 82-year-old gentleman comes in for follow-up consultation while being treated for TTR/amyloid cardiomyopathy.  On Vyndamax.\par He is tolerating his Spironolactone, metoprolol very well. \par Main complaint is spinal stenosis.  Being treated.  With physical therapy he has started playing golf again.  Tries to walk as much as possible.  Does 3 times a week regular exercises\par He has no chest pain, PND, orthopnea.\par He has no dizziness, lightheadedness, or palpitations.\par He has no bleeding diathesis.\par He has no significant headache, visual disturbances, focal weakness.\par He has no recent hospitalization \par No complaint of neuropathy symptoms\par No tendinopathy.  No bleeding\par

## 2023-05-01 NOTE — DISCUSSION/SUMMARY
[Patient] : the patient [Risks] : risks [Benefits] : benefits [Alternatives] : alternatives [With Me] : with me [FreeTextEntry1] : Assessment and recommendations.\par #1 Nonischemic cardiomyopathy.  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  And Dr. Villa\par Euvolemic state.  No signs of congestion.  No signs of low-flow state.\par Continue present regimen of medications.\par Labs ordered.  Which includes BNP level.\par Repeat echocardiogram ordered\par # 2 essential hypertension.Much better controlled. Continue present regimen of medications.  Follow-up labs and prescription done.\par # 3 hyperlipidemia. On cholesterol-lowering agent. Abnormal liver function tests in the past. Negative hepatitis screen. 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  \par #5  Tricuspid and mitral regurgitation. No signs of congestive heart failure. \par Thoracic aortic dilation.  Follow-up echocardiogram.  Continue blood pressure heart rate control\par #6 chronic atrial fibrillation .  Ventricular rate well controlled.  Left atrial enlargement.  Continue with metoprolol.  He'll continue with anticoagulation. Watch for bleeding.  High risk medication use.  Stable without complications\par \par \par Counseling regarding low saturated fat, low carbohydrate intake was reviewed. Active lifestyle and regular. Exercise is along with weight maintenance is advised.\par All the above were at length reviewed. Answered all the questions. Thank you very much for this kind referral. Please do not hesitate to give me a call for any question.\par Part of this transcription was done with voice recognition software and phonetically similar errors are common. I apologize for that. Please donot hesitate to call for any questions due to above.\par \par Sincerely,\par Cornelius Neal MD,FACC,MARCEL\par \par

## 2023-05-01 NOTE — CARDIOLOGY SUMMARY
[___] : [unfilled] [LVEF ___%] : LVEF [unfilled]% [Mild] : mild pulmonary hypertension [de-identified] :  AFib, low voltage in the limb leads, poor R-wave progression  [de-identified] : 9/2022: LVEF 50-55%, LVDD 4.4, mild MR, nomral RV function, dilated right and left atrium, mild TR\par  9/2021: EF 50-55%, severely dilated LA, , mild MR, normal RV function  [de-identified] : 8/12/2019 - PYP scan consistent with TTR amyloidosis (grade 3, heart to lung ratio 1.52)   [de-identified] : Cardiac MRI: 5/24/2019 - severely reduced LVEF 23%, severely dilated atria, extensive LGE involving entire endocardium of LV, interventricular septum, and RV free wall, consistent with amyloidosis

## 2023-05-01 NOTE — ASSESSMENT
[FreeTextEntry1] : Reviewed on October 2, 2018.\par EKG ordered and interpreted by me October 2, 2018 indications atrial fibrillation. Interpretation atrial fibrillation. Right axis deviation. Nonspecific IVCD. No new change compared to before\par \par past tests for reference\par Echocardiogram  which was done on April 5, 2017, PA EF was 58%. Moderate mitral the decision. Ascending and ordered Dilantin 4 cm aortic arch difficult to visualize possible dilation at 4.6 cm. Moderate mitral regurgitation noted\par CTA;4/12/17: ascending aorta: 3.8 cm.\par Echocardiogram. Reviewed April 2, 2018.\par His ejection fraction around 50%. LVH noted\par Labs from January 2018 were reviewed\par \par \par Cardiac MRI, May 24, 2019. Reviewed today. Was not available to today to review. LV ejection fraction 23% via an ejection fraction 23%. 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 Also possibility of other nonischemic etiologies.\par Labs  May 1, 2019 reviewed. ESR 4 iron studies were normal. TSH 1.85.\par Titers were negative. Serum electrophoresis no significant M spike.\par Reviewed on April 30, 2019\par Recent labs from April 9, 2019\par Showed stable CBC, CMP, and lipid panel.\par Echocardiogram April 18, 2019 showed an ejection fraction around 50%. There is mild to moderate concentric LVH. There is biatrial enlargement. The aortic dimension 4.1 cm. Severe diastolic dysfunction. Mild to moderate mitral and tricuspid regurgitation. Pulmonary artery systolic pressure around 45 mm mercury. There was no pericardial effusion noted\par \par Reviewed on September 18, 2019\par Labs from September 12, 2019 creatinine 0.95, potassium 4.7, sodium 142, glucose 96.\par Echocardiogram August 27, 2019 and ejection fraction 45-50%. Concentric LVH. Normal longitudinal strain at the apex. Global longitudinal strain reduced suggestive of diastolic dysfunction. Moderate concentric LVH. Significant right atrial enlargement.\par \par Reviewed September 11, 2020\par EKG as noted above\par Last labs are from November 2019\par \par Reviewed 9/17/2021.\par Labs 9/9/2021.  Total cholesterol 134 HDL 46 LDL 72 sodium 140 potassium 4.4 creatinine 1.08 LFTs stable TSH 1.63 CBC stable.\par EKG as noted above\par \par Reviewed on August 4, 2022\par EKG as noted above.\par Echocardiogram September 30, 2021 EF 50 to 55% mild mitral regurgitation.  Severely dilated left atrium.  Mild global LV systolic dysfunction.  Moderate concentric LVH moderate right atrial enlargement.  RV enlargement.  RVSP 32 mmHg no pericardial effusion\par \par Reviewed on October 7, 2022\par Labs from October 3, 2022 normal CBC sodium 140 potassium 5.1 creatinine 0.98 LFT normal LDL 63 HDL 46 triglycerides 71 total cholesterol 120 N-terminal proBNP 1317\par \par Reviewed on May 1, 2023.  Labs April 11, 2023 normal CBC except platelet count 142 sodium 142 potassium 5.0 creatinine 1.12 glucose 107 LFT normal N-terminal proBNP 1391

## 2023-05-01 NOTE — PHYSICAL EXAM
[General Appearance - Well Developed] : well developed [Normal Appearance] : normal appearance [Well Groomed] : well groomed [General Appearance - Well Nourished] : well nourished [No Deformities] : no deformities [General Appearance - In No Acute Distress] : no acute distress [Normal Jugular Venous A Waves Present] : normal jugular venous A waves present [Normal Jugular Venous V Waves Present] : normal jugular venous V waves present [No Jugular Venous Beth A Waves] : no jugular venous beth A waves [] : no respiratory distress [Respiration, Rhythm And Depth] : normal respiratory rhythm and effort [Exaggerated Use Of Accessory Muscles For Inspiration] : no accessory muscle use [Auscultation Breath Sounds / Voice Sounds] : lungs were clear to auscultation bilaterally [Arterial Pulses Normal] : the arterial pulses were normal [Edema] : no peripheral edema present [Nail Clubbing] : no clubbing of the fingernails [Cyanosis, Localized] : no localized cyanosis [FreeTextEntry1] : Arthritic gait Libtayo Pregnancy And Lactation Text: This medication is contraindicated in pregnancy and when breast feeding.

## 2023-06-09 ENCOUNTER — OFFICE (OUTPATIENT)
Dept: URBAN - METROPOLITAN AREA CLINIC 38 | Facility: CLINIC | Age: 83
Setting detail: OPHTHALMOLOGY
End: 2023-06-09
Payer: MEDICARE

## 2023-06-09 DIAGNOSIS — C44.1122: ICD-10-CM

## 2023-06-09 DIAGNOSIS — H02.822: ICD-10-CM

## 2023-06-09 DIAGNOSIS — H02.012: ICD-10-CM

## 2023-06-09 DIAGNOSIS — H02.132: ICD-10-CM

## 2023-06-09 PROCEDURE — 99213 OFFICE O/P EST LOW 20 MIN: CPT | Performed by: OPHTHALMOLOGY

## 2023-06-09 ASSESSMENT — SPHEQUIV_DERIVED
OS_SPHEQUIV: 0.5
OD_SPHEQUIV: 0.5

## 2023-06-09 ASSESSMENT — KERATOMETRY
OS_K1POWER_DIOPTERS: 44.25
OD_K2POWER_DIOPTERS: 44.75
OD_K1POWER_DIOPTERS: 43.50
OS_K2POWER_DIOPTERS: 45.00
OD_AXISANGLE_DEGREES: 017
OS_AXISANGLE_DEGREES: 152

## 2023-06-09 ASSESSMENT — REFRACTION_AUTOREFRACTION
OS_AXIS: 081
OD_SPHERE: +1.25
OS_CYLINDER: -1.50
OD_CYLINDER: -1.50
OS_SPHERE: +1.25
OD_AXIS: 097

## 2023-06-09 ASSESSMENT — AXIALLENGTH_DERIVED
OS_AL: 22.9992
OD_AL: 23.175

## 2023-06-09 ASSESSMENT — VISUAL ACUITY
OS_BCVA: 20/40-2
OD_BCVA: 20/40-1

## 2023-06-09 ASSESSMENT — CONFRONTATIONAL VISUAL FIELD TEST (CVF)
OD_FINDINGS: FULL
OS_FINDINGS: FULL

## 2023-06-09 ASSESSMENT — LID EXAM ASSESSMENTS: OD_TRICHIASIS: RLL

## 2023-06-09 ASSESSMENT — LID POSITION - ECTROPION: OD_ECTROPION: RLL

## 2023-10-27 ENCOUNTER — APPOINTMENT (OUTPATIENT)
Dept: CARDIOLOGY | Facility: CLINIC | Age: 83
End: 2023-10-27
Payer: MEDICARE

## 2023-10-27 ENCOUNTER — NON-APPOINTMENT (OUTPATIENT)
Age: 83
End: 2023-10-27

## 2023-10-27 VITALS
DIASTOLIC BLOOD PRESSURE: 72 MMHG | OXYGEN SATURATION: 99 % | HEIGHT: 72 IN | HEART RATE: 77 BPM | WEIGHT: 179 LBS | BODY MASS INDEX: 24.24 KG/M2 | SYSTOLIC BLOOD PRESSURE: 120 MMHG

## 2023-10-27 DIAGNOSIS — I77.810 THORACIC AORTIC ECTASIA: ICD-10-CM

## 2023-10-27 PROCEDURE — 99215 OFFICE O/P EST HI 40 MIN: CPT

## 2023-10-27 PROCEDURE — 93306 TTE W/DOPPLER COMPLETE: CPT

## 2023-10-27 PROCEDURE — 93000 ELECTROCARDIOGRAM COMPLETE: CPT

## 2023-10-27 RX ORDER — ATORVASTATIN CALCIUM 40 MG/1
40 TABLET, FILM COATED ORAL
Qty: 90 | Refills: 3 | Status: ACTIVE | COMMUNITY
Start: 1900-01-01 | End: 1900-01-01

## 2024-04-22 ENCOUNTER — APPOINTMENT (OUTPATIENT)
Dept: CARDIOLOGY | Facility: CLINIC | Age: 84
End: 2024-04-22
Payer: MEDICARE

## 2024-04-22 VITALS
BODY MASS INDEX: 24.38 KG/M2 | SYSTOLIC BLOOD PRESSURE: 122 MMHG | WEIGHT: 180 LBS | HEIGHT: 72 IN | HEART RATE: 95 BPM | DIASTOLIC BLOOD PRESSURE: 78 MMHG | OXYGEN SATURATION: 98 %

## 2024-04-22 DIAGNOSIS — I34.0 NONRHEUMATIC MITRAL (VALVE) INSUFFICIENCY: ICD-10-CM

## 2024-04-22 DIAGNOSIS — I11.0 HYPERTENSIVE HEART DISEASE WITH HEART FAILURE: ICD-10-CM

## 2024-04-22 DIAGNOSIS — Z79.01 LONG TERM (CURRENT) USE OF ANTICOAGULANTS: ICD-10-CM

## 2024-04-22 DIAGNOSIS — E78.5 HYPERLIPIDEMIA, UNSPECIFIED: ICD-10-CM

## 2024-04-22 DIAGNOSIS — I43 ORGAN-LIMITED AMYLOIDOSIS: ICD-10-CM

## 2024-04-22 DIAGNOSIS — E85.4 ORGAN-LIMITED AMYLOIDOSIS: ICD-10-CM

## 2024-04-22 DIAGNOSIS — I48.20 CHRONIC ATRIAL FIBRILLATION, UNSP: ICD-10-CM

## 2024-04-22 PROCEDURE — G2211 COMPLEX E/M VISIT ADD ON: CPT

## 2024-04-22 PROCEDURE — 99214 OFFICE O/P EST MOD 30 MIN: CPT

## 2024-04-22 RX ORDER — METOPROLOL TARTRATE 50 MG/1
50 TABLET, FILM COATED ORAL
Qty: 180 | Refills: 3 | Status: ACTIVE | COMMUNITY
Start: 1900-01-01 | End: 1900-01-01

## 2024-04-22 RX ORDER — SPIRONOLACTONE 25 MG/1
25 TABLET ORAL
Qty: 90 | Refills: 3 | Status: ACTIVE | COMMUNITY
Start: 2019-08-05 | End: 1900-01-01

## 2024-04-22 RX ORDER — APIXABAN 5 MG/1
5 TABLET, FILM COATED ORAL
Qty: 180 | Refills: 3 | Status: ACTIVE | COMMUNITY
Start: 2018-04-27 | End: 1900-01-01

## 2024-04-22 RX ORDER — TAFAMIDIS 61 MG/1
61 CAPSULE, LIQUID FILLED ORAL
Qty: 30 | Refills: 11 | Status: DISCONTINUED | COMMUNITY
Start: 2021-01-25 | End: 2024-04-22

## 2024-04-22 NOTE — DISCUSSION/SUMMARY
[FreeTextEntry1] : Assessment and recommendations. #1 Nonischemic cardiomyopathy.  Class I functional status. Stage C. GDMT with metoprolol spironolactone TTR amyloid cardiomyopathy. Now being followed by Dr. Nunes.   Euvolemic state.  No signs of congestion.  No signs of low-flow state. Continue present regimen of medications. Labs ordered.  Which includes BNP level. Continue to follow and manage with Dr. Nunes's office considering his of Vyndamax. # 2 essential hypertension. Much better controlled. Continue present regimen of medications.  Follow-up labs and prescription done.   Goal less than 130/80 # 3 hyperlipidemia. On cholesterol-lowering agent. Abnormal liver function tests in the past. Negative hepatitis screen. 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   #5  Tricuspid and mitral regurgitation. No signs of congestive heart failure.  Thoracic aortic dilation. stable echo today. Continue blood pressure heart rate control #6 chronic atrial fibrillation .  Ventricular rate well controlled.  Left atrial enlargement.  Continue with metoprolol.  He'll continue with anticoagulation. Watch for bleeding.  High risk medication use.  Stable without complications   Counseling regarding low saturated fat, low carbohydrate intake was reviewed. Active lifestyle and regular. Exercise is along with weight maintenance is advised. All the above were at length reviewed. Answered all the questions. Thank you very much for this kind referral. Please do not hesitate to give me a call for any question. Part of this transcription was done with voice recognition software and phonetically similar errors are common. I apologize for that. Please donot hesitate to call for any questions due to above.  Sincerely, Cornelius Neal MD,FACC,MARCEL

## 2024-04-22 NOTE — ASSESSMENT
[FreeTextEntry1] : Reviewed on October 27, 2023.    Labs from October 12, 2023 Sodium 142 potassium 4.6 creatinine 1.2.  LFTs stable.  Triglycerides 89 HDL 41 LDL 60N-terminal proBNP 1027 Echocardiogram and EKG from today were reviewed.  Reviewed on April 22, 2024.  Labs from April 12, 2024 CBC stable sodium 141 potassium 4.3 creatinine 1.15 LFTs stable.  N-terminal proBNP 1049.

## 2024-04-22 NOTE — REASON FOR VISIT
[FreeTextEntry3] : Dr. Burgess [FreeTextEntry1] : 83-year-old gentleman comes in for follow-up consultation while being treated for TTR/amyloid cardiomyopathy.  Off Vyndamax for last few months.  Has worked with Dr. Nunes's office without success to obtain medication. He is tolerating his Spironolactone, metoprolol very well.    Main complaint is spinal stenosis.  Being treated.  Tries to play golf during summer months.  Tries to walk as much as possible.  Does 3 times a week regular exercises.  3 to 4000 m bicycling on a regular basis. He has no chest pain, PND, orthopnea.  His weight has remained stable. He has no dizziness, lightheadedness, or palpitations. He has no bleeding diathesis. He has no significant headache, visual disturbances, focal weakness. He has no recent hospitalization  No complaint of neuropathy symptoms No tendinopathy.  No bleeding

## 2024-04-22 NOTE — CARDIOLOGY SUMMARY
[de-identified] :  AFib, low voltage in the limb leads, poor R-wave progression  October 27, 2023 atrial fibrillation.  Poor R wave progression. [de-identified] : 9/2022: LVEF 50-55%, LVDD 4.4, mild MR, nomral RV function, dilated right and left atrium, mild TR  9/2021: EF 50-55%, severely dilated LA, , mild MR, normal RV function  October 2023.  EF 60 to 65%.  Dilated right atrium.  Mild MR.  Normal RV function.  Abnormal GLS suggestive of amyloidosis. [de-identified] : 8/12/2019 - PYP scan consistent with TTR amyloidosis (grade 3, heart to lung ratio 1.52)   [de-identified] : Cardiac MRI: 5/24/2019 - severely reduced LVEF 23%, severely dilated atria, extensive LGE involving entire endocardium of LV, interventricular septum, and RV free wall, consistent with amyloidosis

## 2024-10-28 ENCOUNTER — APPOINTMENT (OUTPATIENT)
Dept: CARDIOLOGY | Facility: CLINIC | Age: 84
End: 2024-10-28
Payer: MEDICARE

## 2024-10-28 PROCEDURE — 93306 TTE W/DOPPLER COMPLETE: CPT

## 2024-11-25 ENCOUNTER — APPOINTMENT (OUTPATIENT)
Dept: CARDIOLOGY | Facility: CLINIC | Age: 84
End: 2024-11-25
Payer: MEDICARE

## 2024-11-25 VITALS
SYSTOLIC BLOOD PRESSURE: 122 MMHG | DIASTOLIC BLOOD PRESSURE: 78 MMHG | HEIGHT: 72 IN | HEART RATE: 71 BPM | OXYGEN SATURATION: 98 % | WEIGHT: 179 LBS | BODY MASS INDEX: 24.24 KG/M2

## 2024-11-25 DIAGNOSIS — I34.0 NONRHEUMATIC MITRAL (VALVE) INSUFFICIENCY: ICD-10-CM

## 2024-11-25 DIAGNOSIS — I10 ESSENTIAL (PRIMARY) HYPERTENSION: ICD-10-CM

## 2024-11-25 DIAGNOSIS — I43 ORGAN-LIMITED AMYLOIDOSIS: ICD-10-CM

## 2024-11-25 DIAGNOSIS — E78.5 HYPERLIPIDEMIA, UNSPECIFIED: ICD-10-CM

## 2024-11-25 DIAGNOSIS — I42.9 CARDIOMYOPATHY, UNSPECIFIED: ICD-10-CM

## 2024-11-25 DIAGNOSIS — I48.20 CHRONIC ATRIAL FIBRILLATION, UNSP: ICD-10-CM

## 2024-11-25 DIAGNOSIS — E85.4 ORGAN-LIMITED AMYLOIDOSIS: ICD-10-CM

## 2024-11-25 PROCEDURE — 99214 OFFICE O/P EST MOD 30 MIN: CPT

## 2024-12-20 ENCOUNTER — OFFICE (OUTPATIENT)
Dept: URBAN - METROPOLITAN AREA CLINIC 97 | Facility: CLINIC | Age: 84
Setting detail: OPHTHALMOLOGY
End: 2024-12-20
Payer: MEDICARE

## 2024-12-20 DIAGNOSIS — H33.312: ICD-10-CM

## 2024-12-20 DIAGNOSIS — H01.001: ICD-10-CM

## 2024-12-20 DIAGNOSIS — H01.005: ICD-10-CM

## 2024-12-20 DIAGNOSIS — H25.13: ICD-10-CM

## 2024-12-20 DIAGNOSIS — H52.4: ICD-10-CM

## 2024-12-20 DIAGNOSIS — H01.002: ICD-10-CM

## 2024-12-20 DIAGNOSIS — H02.012: ICD-10-CM

## 2024-12-20 DIAGNOSIS — H01.004: ICD-10-CM

## 2024-12-20 DIAGNOSIS — H02.132: ICD-10-CM

## 2024-12-20 DIAGNOSIS — H43.813: ICD-10-CM

## 2024-12-20 PROCEDURE — 92014 COMPRE OPH EXAM EST PT 1/>: CPT | Performed by: OPHTHALMOLOGY

## 2024-12-20 PROCEDURE — 92015 DETERMINE REFRACTIVE STATE: CPT | Performed by: OPHTHALMOLOGY

## 2024-12-20 ASSESSMENT — REFRACTION_AUTOREFRACTION
OD_AXIS: 013
OD_SPHERE: -0.75
OS_CYLINDER: +1.75
OS_AXIS: 168
OD_CYLINDER: +2.50
OS_SPHERE: -0.25

## 2024-12-20 ASSESSMENT — LID EXAM ASSESSMENTS
OD_BLEPHARITIS: RLL RUL 2+
OS_BLEPHARITIS: LLL LUL 2+
OD_TRICHIASIS: RLL 2+
OD_MEIBOMITIS: RLL RUL 2+
OS_MEIBOMITIS: LLL LUL 2+

## 2024-12-20 ASSESSMENT — VISUAL ACUITY
OD_BCVA: 20/50
OS_BCVA: 20/50+

## 2024-12-20 ASSESSMENT — REFRACTION_MANIFEST
OU_VA: 20/40+2
OS_ADD: +3.00
OD_AXIS: 013
OU_VA: 20/40+2
OS_AXIS: 168
OD_SPHERE: -1.00
OS_SPHERE: -0.50
OD_AXIS: 013
OD_SPHERE: -1.25
OS_VA1: 20/40-2
OD_CYLINDER: +2.00
OS_SPHERE: -0.75
OD_CYLINDER: +2.00
OD_VA2: 20/25
OS_VA1: 20/40-2
OS_VA2: 20/25
OS_AXIS: 170
OS_VA2: 20/25
OD_ADD: +3.00
OD_VA1: 20/30
OS_CYLINDER: +2.25
OD_VA1: 20/30
OD_VA2: 20/25
OS_ADD: +3.25
OD_ADD: +3.25
OS_CYLINDER: +1.75

## 2024-12-20 ASSESSMENT — CONFRONTATIONAL VISUAL FIELD TEST (CVF)
OD_FINDINGS: FULL
OS_FINDINGS: FULL

## 2024-12-20 ASSESSMENT — LID POSITION - ECTROPION: OD_ECTROPION: RLL T

## 2024-12-20 ASSESSMENT — KERATOMETRY
OS_K2POWER_DIOPTERS: 44.75
OD_K1POWER_DIOPTERS: 43.50
OD_AXISANGLE_DEGREES: 018
OD_K2POWER_DIOPTERS: 44.75
OS_K1POWER_DIOPTERS: 43.75
OS_AXISANGLE_DEGREES: 156

## 2025-04-02 ENCOUNTER — RX RENEWAL (OUTPATIENT)
Age: 85
End: 2025-04-02

## 2025-04-28 ENCOUNTER — RX RENEWAL (OUTPATIENT)
Age: 85
End: 2025-04-28

## 2025-05-13 ENCOUNTER — NON-APPOINTMENT (OUTPATIENT)
Age: 85
End: 2025-05-13

## 2025-05-13 ENCOUNTER — APPOINTMENT (OUTPATIENT)
Dept: CARDIOLOGY | Facility: CLINIC | Age: 85
End: 2025-05-13
Payer: MEDICARE

## 2025-05-13 VITALS
OXYGEN SATURATION: 99 % | HEART RATE: 96 BPM | SYSTOLIC BLOOD PRESSURE: 106 MMHG | HEIGHT: 72 IN | DIASTOLIC BLOOD PRESSURE: 74 MMHG | BODY MASS INDEX: 24.38 KG/M2 | WEIGHT: 180 LBS

## 2025-05-13 DIAGNOSIS — I11.0 HYPERTENSIVE HEART DISEASE WITH HEART FAILURE: ICD-10-CM

## 2025-05-13 DIAGNOSIS — I77.810 THORACIC AORTIC ECTASIA: ICD-10-CM

## 2025-05-13 DIAGNOSIS — I43 ORGAN-LIMITED AMYLOIDOSIS: ICD-10-CM

## 2025-05-13 DIAGNOSIS — Z79.01 LONG TERM (CURRENT) USE OF ANTICOAGULANTS: ICD-10-CM

## 2025-05-13 DIAGNOSIS — I48.20 CHRONIC ATRIAL FIBRILLATION, UNSP: ICD-10-CM

## 2025-05-13 DIAGNOSIS — E78.5 HYPERLIPIDEMIA, UNSPECIFIED: ICD-10-CM

## 2025-05-13 DIAGNOSIS — E85.4 ORGAN-LIMITED AMYLOIDOSIS: ICD-10-CM

## 2025-05-13 PROCEDURE — G2211 COMPLEX E/M VISIT ADD ON: CPT

## 2025-05-13 PROCEDURE — 93000 ELECTROCARDIOGRAM COMPLETE: CPT

## 2025-05-13 PROCEDURE — 99214 OFFICE O/P EST MOD 30 MIN: CPT
